# Patient Record
Sex: MALE | Race: WHITE | Employment: FULL TIME | URBAN - METROPOLITAN AREA
[De-identification: names, ages, dates, MRNs, and addresses within clinical notes are randomized per-mention and may not be internally consistent; named-entity substitution may affect disease eponyms.]

---

## 2017-02-08 ENCOUNTER — TELEPHONE (OUTPATIENT)
Dept: INTERNAL MEDICINE CLINIC | Age: 47
End: 2017-02-08

## 2017-02-08 DIAGNOSIS — E55.9 HYPOVITAMINOSIS D: ICD-10-CM

## 2017-02-08 DIAGNOSIS — Z00.00 ROUTINE GENERAL MEDICAL EXAMINATION AT A HEALTH CARE FACILITY: Primary | ICD-10-CM

## 2017-02-08 DIAGNOSIS — I10 ESSENTIAL HYPERTENSION: ICD-10-CM

## 2017-02-08 DIAGNOSIS — E78.5 DYSLIPIDEMIA: ICD-10-CM

## 2017-02-08 DIAGNOSIS — E66.9 OBESITY, UNSPECIFIED OBESITY SEVERITY, UNSPECIFIED OBESITY TYPE: ICD-10-CM

## 2017-02-08 DIAGNOSIS — R73.01 IFG (IMPAIRED FASTING GLUCOSE): ICD-10-CM

## 2017-02-08 NOTE — TELEPHONE ENCOUNTER
Please update labs. Pt was turned away at [facility] this morning saying no labs in system. He will go in tomorrow.

## 2017-02-10 ENCOUNTER — HOSPITAL ENCOUNTER (OUTPATIENT)
Dept: LAB | Age: 47
Discharge: HOME | End: 2017-02-10

## 2017-02-10 DIAGNOSIS — Z00.00 ROUTINE GENERAL MEDICAL EXAMINATION AT A HEALTH CARE FACILITY: ICD-10-CM

## 2017-02-10 DIAGNOSIS — R73.01 IFG (IMPAIRED FASTING GLUCOSE): ICD-10-CM

## 2017-02-10 DIAGNOSIS — E78.5 DYSLIPIDEMIA: ICD-10-CM

## 2017-02-10 DIAGNOSIS — E55.9 HYPOVITAMINOSIS D: ICD-10-CM

## 2017-02-10 DIAGNOSIS — E66.9 OBESITY, UNSPECIFIED OBESITY SEVERITY, UNSPECIFIED OBESITY TYPE: ICD-10-CM

## 2017-02-10 DIAGNOSIS — I10 ESSENTIAL HYPERTENSION: ICD-10-CM

## 2017-02-10 PROCEDURE — 99001 SPECIMEN HANDLING PT-LAB: CPT | Performed by: INTERNAL MEDICINE

## 2017-02-10 NOTE — PROGRESS NOTES
48 y.o. WHITE OR  male who presents for RPE    No cardiovascular complaints, bp in the 943O systolic when he checks      Denies polyuria, polydipsia, nocturia, vision change. Not checking sugars at this time. He had gotten down to 195lbs in the summer w better diet but gained it all back as the weather got colder. He is interested in hearing about haydee supp    Vitals 2/15/2017 12/2/2016 11/11/2016 11/4/2016 11/4/2016 3/1/2016   Weight 222 lb 205 lb 210 lb  210 lb 215 lb     Vitals 8/28/2015   Weight 217 lb 8 oz     No gi or gu complaints, no focal neuro complaints. He now reports having Fhx colon ca     He saw Dr Jesus Guadalupe for rheum opinion last spring, no new recs.   The psoriasis is not that active and no spondyloarthropathy sx to report, mostly his hands bother him and only intermittently, no morning stiffness    Past Medical History   Diagnosis Date    Calculus of kidney      Dr Gianna Mcmillan 2008; 11/16; ca oxalate in past    Cellulitis 1/14     Dr Gilford Jesus left leg    Dyslipidemia     Hypertension     Obesity      peak weight 220 lbs, bmi 30.7 from 2/15    Prediabetes 2011     2 hr gtt 160    Proptosis      right side, negative thyroid eval    Psoriasis      Past Surgical History   Procedure Laterality Date    Hx appendectomy      Hx heent       ear drum    Hx orthopaedic  12/13     left knee arthroscopy Dr Arash Amaya     Social History     Social History    Marital status:      Spouse name: N/A    Number of children: N/A    Years of education: N/A     Occupational History    rep for [employer]     Social History Main Topics    Smoking status: Former Smoker     Packs/day: 0.25     Years: 15.00     Quit date: 1/1/2003    Smokeless tobacco: Never Used    Alcohol use 1.5 oz/week     3 Glasses of wine per week      Comment: socially    Drug use: No    Sexual activity: Not on file     Other Topics Concern    Not on file     Social History Narrative     Current Outpatient Prescriptions   Medication Sig    lorcaserin (BELVIQ) 10 mg tab Take 1 Tab by mouth two (2) times a day. Max Daily Amount: 2 Tabs.  lisinopril (PRINIVIL, ZESTRIL) 10 mg tablet TAKE ONE TABLET BY MOUTH ONE TIME DAILY FOR BLOOD PRESSURE    atorvastatin (LIPITOR) 10 mg tablet TAKE ONE TABLET BY MOUTH ONE TIME DAILY    VITAMIN D2 50,000 unit capsule TAKE 1 TABLET BY MOUTH EVERY 7 DAYS    aspirin 81 mg tablet Take 81 mg by mouth. No current facility-administered medications for this visit. Allergies   Allergen Reactions    Omnicef [Cefdinir] Diarrhea     REVIEW OF SYSTEMS:   Ophtho - no vision change or eye pain  Oral - no mouth pain, tongue or tooth problems  Ears - no hearing loss, ear pain, fullness, no swallowing problems  Cardiac - no CP, PND, orthopnea, edema, palpitations or syncope  Chest - no breast masses  Resp - no wheezing, chronic coughing, dyspnea  GI - no heartburn, nausea, vomiting, change in bowel habits, bleeding, hemorrhoids  Urinary - no dysuria, hematuria, flank pain, urgency, frequency  Genitals - no genital lesions, discharge, masses, ulceration, warts  Derm - no nail abnormalities, rashes, lesions of note, hair loss  Psych - denies any anxiety or depression symptoms, no hallucinations or violent ideation  Constitutional - no wt loss, night sweats, unexplained fevers  Neuro - no focal weakness, numbness, paresthesias, incoordination, ataxia, involuntary movements  Endo - no polyuria, polydipsia, nocturia, hot flashes    Visit Vitals    /80    Pulse 80    Temp 98.1 °F (36.7 °C) (Oral)    Ht 5' 11\" (1.803 m)    Wt 222 lb (100.7 kg)    SpO2 97%    BMI 30.96 kg/m2   Affect is appropriate.   Mood stable  No apparent distress  HEENT --Anicteric sclerae, tympanic membranes normal,  ear canals normal.  PERRL, EOMI, conjunctiva and lids normal.  Disks were sharp  Sinuses were nontender, turbinates normal, hearing normal.  Oropharynx without  erythema, normal tongue, oral mucosa and tonsils. No thyromegaly, JVD, or bruits. Lungs --Clear to auscultation, normal percussion. Heart --Regular rate and rhythm, no murmurs, rubs, gallops, or clicks. Chest wall --Nontender to palpation. PMI normal.  Abdomen -- Soft and nontender, no hepatosplenomegaly or masses.  and rectal deferred w recent eval by urology  Extremities -- Without cyanosis, clubbing, edema. 2+ pulses equally and bilaterally.     LABS  From 9/06 showed   gluc 98,   cr 1.10,      alt 63,       chol 124, tg 323, hdl 28, ldl-c 31, wbc 5.7, hb 15.7, plt 258, tsh 1.86, ua neg, ast 40  From 11/09 showed gluc 96,   cr 1.20, gfr>60,  alt 66,       chol 125, tg 122, hdl 33, ldl-c 68  From 10/11 showed gluc 103, cr 1.14, gfr 79,   alt 42,          ldl-p 1106,                       tsh 1.80  From 3/12 showed   gluc 102, cr 0.87, gfr 106, alt 43, hba1c 5.7, ldl-p 528,   chol 102, tg 170, hdl 36, ldl-c 32  From 1/14 showed   gluc 99,   cr 0.93, gfr>60,                 wbc 9.5, hb 14.7, plt 326  From 2/15 showed   gluc 111, cr 0.84, gfr 105, alt 34, hba1c 5.7,      chol 108, tg 202, hdl 34, ldl-c 34, wbc 6.3, hb 15.3, plt 287,       ua neg  From 2/15 showed                   tsh 1.50, vit d 18.2, uric 5.0, hep b/c neg, ra neg, jeane neg, ccp neg, esr 2  From 10/15 showed         hba1c 5.6,      chol 92,   tg 141, hdl 31, ldl-c 33  From 2/16 showed   gluc 98,   cr 0.82, gfr>60,  alt 40,       chol 96,   tg 239, hdl 34, ldl-c 48, wbc 8.3, hb 15.5, plt 258, tsh 1.11, ua neg  From 3/16 showed                              ra neg, jeane neg, ccp neg, esr 2, crp 0.3  From 11/16 showed gluc 149, cr 1.30, gfr 59,   alt 51,                wbc 8.5, hb 15.1, plt 266, lip 175    Results for orders placed or performed during the hospital encounter of 63/52/15   METABOLIC PANEL, COMPREHENSIVE   Result Value Ref Range    Glucose 114 (H) 65 - 99 mg/dL    BUN 13 6 - 24 mg/dL    Creatinine 0.90 0.76 - 1.27 mg/dL    GFR est non- >59 mL/min/1.73    GFR est  >59 mL/min/1.73    BUN/Creatinine ratio 14 9 - 20    Sodium 140 134 - 144 mmol/L    Potassium 4.5 3.5 - 5.2 mmol/L    Chloride 99 96 - 106 mmol/L    CO2 22 18 - 29 mmol/L    Calcium 9.4 8.7 - 10.2 mg/dL    Protein, total 6.9 6.0 - 8.5 g/dL    Albumin 4.4 3.5 - 5.5 g/dL    GLOBULIN, TOTAL 2.5 1.5 - 4.5 g/dL    A-G Ratio 1.8 1.1 - 2.5    Bilirubin, total 0.5 0.0 - 1.2 mg/dL    Alk. phosphatase 60 39 - 117 IU/L    AST (SGOT) 27 0 - 40 IU/L    ALT (SGPT) 32 0 - 44 IU/L   LIPID PANEL   Result Value Ref Range    Cholesterol, total 98 (L) 100 - 199 mg/dL    Triglyceride 174 (H) 0 - 149 mg/dL    HDL Cholesterol 29 (L) >39 mg/dL    VLDL, calculated 35 5 - 40 mg/dL    LDL, calculated 34 0 - 99 mg/dL   HEMOGLOBIN A1C   Result Value Ref Range    Hemoglobin A1c 5.7 (H) 4.8 - 5.6 %    Estimated average glucose 117 mg/dL   TSH, 3RD GENERATION   Result Value Ref Range    TSH 1.530 0.450 - 4.500 uIU/mL   VITAMIN D, 25 HYDROXY   Result Value Ref Range    VITAMIN D, 25-HYDROXY 34.2 30.0 - 100.0 ng/mL   CK   Result Value Ref Range    Creatine Kinase,Total 296 (H) 24 - 204 U/L   CVD REPORT   Result Value Ref Range    INTERPRETATION Note      Patient Active Problem List   Diagnosis Code    Dyslipidemia E78.5    Obesity E66.9    IFG (impaired fasting glucose) R73.01    Hypovitaminosis D E55.9    Essential hypertension I10    Obesity (BMI 30.0-34. 9) E66.9    Primary hypertension I10    Nephrolithiasis Dr Hilario Gaxiola N20.0     Assessment and plan:  1. Dyslipidemia. Continue lipitor  2. Prediabetes. Lifestyle and dietary measures, wt loss would be ideal.   3.  HTN. Continue current  4. Obesity. Lifestyle and dietary measures, portion control. Long discussion about available haydee supp, he opted on trial of belviq.  sfx discussed at length, script and coupon given, f/u 3-4 mos  5. Rheum. Observation  6. Hypovit D. Doing well   7. Nephrolithiasis.   Hydration and f/u urology      RTC 8/17    Above conditions discussed at length and patient vocalized understanding.   All questions answered to patient satifaction

## 2017-02-11 LAB
25(OH)D3+25(OH)D2 SERPL-MCNC: 34.2 NG/ML (ref 30–100)
ALBUMIN SERPL-MCNC: 4.4 G/DL (ref 3.5–5.5)
ALBUMIN/GLOB SERPL: 1.8 {RATIO} (ref 1.1–2.5)
ALP SERPL-CCNC: 60 IU/L (ref 39–117)
ALT SERPL-CCNC: 32 IU/L (ref 0–44)
AST SERPL-CCNC: 27 IU/L (ref 0–40)
BILIRUB SERPL-MCNC: 0.5 MG/DL (ref 0–1.2)
BUN SERPL-MCNC: 13 MG/DL (ref 6–24)
BUN/CREAT SERPL: 14 (ref 9–20)
CALCIUM SERPL-MCNC: 9.4 MG/DL (ref 8.7–10.2)
CHLORIDE SERPL-SCNC: 99 MMOL/L (ref 96–106)
CHOLEST SERPL-MCNC: 98 MG/DL (ref 100–199)
CK SERPL-CCNC: 296 U/L (ref 24–204)
CO2 SERPL-SCNC: 22 MMOL/L (ref 18–29)
CREAT SERPL-MCNC: 0.9 MG/DL (ref 0.76–1.27)
EST. AVERAGE GLUCOSE BLD GHB EST-MCNC: 117 MG/DL
GLOBULIN SER CALC-MCNC: 2.5 G/DL (ref 1.5–4.5)
GLUCOSE SERPL-MCNC: 114 MG/DL (ref 65–99)
HBA1C MFR BLD: 5.7 % (ref 4.8–5.6)
HDLC SERPL-MCNC: 29 MG/DL
INTERPRETATION, 910389: NORMAL
LDLC SERPL CALC-MCNC: 34 MG/DL (ref 0–99)
POTASSIUM SERPL-SCNC: 4.5 MMOL/L (ref 3.5–5.2)
PROT SERPL-MCNC: 6.9 G/DL (ref 6–8.5)
SODIUM SERPL-SCNC: 140 MMOL/L (ref 134–144)
TRIGL SERPL-MCNC: 174 MG/DL (ref 0–149)
TSH SERPL DL<=0.005 MIU/L-ACNC: 1.53 UIU/ML (ref 0.45–4.5)
VLDLC SERPL CALC-MCNC: 35 MG/DL (ref 5–40)

## 2017-02-15 ENCOUNTER — OFFICE VISIT (OUTPATIENT)
Dept: INTERNAL MEDICINE CLINIC | Age: 47
End: 2017-02-15

## 2017-02-15 VITALS
BODY MASS INDEX: 31.08 KG/M2 | TEMPERATURE: 98.1 F | WEIGHT: 222 LBS | OXYGEN SATURATION: 97 % | DIASTOLIC BLOOD PRESSURE: 80 MMHG | HEIGHT: 71 IN | HEART RATE: 80 BPM | SYSTOLIC BLOOD PRESSURE: 114 MMHG

## 2017-02-15 DIAGNOSIS — E78.5 DYSLIPIDEMIA: ICD-10-CM

## 2017-02-15 DIAGNOSIS — I10 PRIMARY HYPERTENSION: ICD-10-CM

## 2017-02-15 DIAGNOSIS — E55.9 HYPOVITAMINOSIS D: ICD-10-CM

## 2017-02-15 DIAGNOSIS — R73.01 IFG (IMPAIRED FASTING GLUCOSE): ICD-10-CM

## 2017-02-15 DIAGNOSIS — E66.9 OBESITY (BMI 30.0-34.9): ICD-10-CM

## 2017-02-15 DIAGNOSIS — Z00.00 PHYSICAL EXAM: Primary | ICD-10-CM

## 2017-02-15 PROBLEM — N20.0 NEPHROLITHIASIS: Status: ACTIVE | Noted: 2017-02-15

## 2017-02-15 NOTE — MR AVS SNAPSHOT
Visit Information Date & Time Provider Department Dept. Phone Encounter #  
 2/15/2017 10:30 AM Adele Soto MD [facility] [address] [id number] Upcoming Health Maintenance Date Due DTaP/Tdap/Td series (1 - Tdap) 12/23/1989 Allergies as of 2/15/2017  Review Complete On: 12/2/2016 By: Wale Celaya Severity Noted Reaction Type Reactions Omnicef [Cefdinir]    Diarrhea Current Immunizations  Reviewed on 1/8/2015 Name Date Influenza Vaccine 11/1/2016, 9/20/2016 Influenza Vaccine Split 10/24/2011 Influenza Vaccine Whole 12/4/2006 Not reviewed this visit You Were Diagnosed With   
  
 Codes Comments Obesity (BMI 30.0-34.9)    -  Primary ICD-10-CM: W02.5 ICD-9-CM: 278.00 Dyslipidemia     ICD-10-CM: E78.5 ICD-9-CM: 272.4 IFG (impaired fasting glucose)     ICD-10-CM: R73.01 
ICD-9-CM: 790.21 Primary hypertension     ICD-10-CM: I10 
ICD-9-CM: 401.9 Hypovitaminosis D     ICD-10-CM: E55.9 ICD-9-CM: 268.9 Vitals BP Pulse Temp Height(growth percentile) Weight(growth percentile) SpO2  
 114/80 80 98.1 °F (36.7 °C) (Oral) 5' 11\" (1.803 m) 222 lb (100.7 kg) 97% BMI Smoking Status 30.96 kg/m2 Former Smoker Vitals History BMI and BSA Data Body Mass Index Body Surface Area 30.96 kg/m 2 2.25 m 2 Preferred Pharmacy Pharmacy Name Phone [facility], [address] [place] [address] [phone] Your Updated Medication List  
  
   
This list is accurate as of: 2/15/17 11:17 AM.  Always use your most recent med list.  
  
  
  
  
 aspirin 81 mg tablet Take 81 mg by mouth. atorvastatin 10 mg tablet Commonly known as:  LIPITOR  
TAKE ONE TABLET BY MOUTH ONE TIME DAILY  
  
 lisinopril 10 mg tablet Commonly known as:  PRINIVIL, ZESTRIL  
TAKE ONE TABLET BY MOUTH ONE TIME DAILY FOR BLOOD PRESSURE  
  
 lorcaserin 10 mg Tab Commonly known as:  Denece Sprinkles Take 1 Tab by mouth two (2) times a day. Max Daily Amount: 2 Tabs. VITAMIN D2 50,000 unit capsule Generic drug:  ergocalciferol TAKE 1 TABLET BY MOUTH EVERY 7 DAYS Prescriptions Printed Refills  
 lorcaserin (BELVIQ) 10 mg tab 5 Sig: Take 1 Tab by mouth two (2) times a day. Max Daily Amount: 2 Tabs. Class: Print Route: Oral  
  
Introducing [facility]! Dear Radha Russo: Thank you for requesting a [employer] account. Our records indicate that you already have an active [employer] account. You can access your account anytime at [email or web address]. [email or web address] Did you know that you can access your hospital and ER discharge instructions at any time in [employer]? You can also review all of your test results from your hospital stay or ER visit. Additional Information If you have questions, please visit the Frequently Asked Questions section of the [employer] website at [email or web address] Remember, [employer] is NOT to be used for urgent needs. For medical emergencies, dial 911. Now available from your iPhone and Android! Please provide this summary of care documentation to your next provider. Your primary care clinician is listed as Bijal Temple. If you have any questions after today's visit, please call [phone].

## 2017-02-15 NOTE — PROGRESS NOTES
1. Have you been to the ER, urgent care clinic or hospitalized since your last visit? NO.     2. Have you seen or consulted any other health care providers outside of the [facility] since your last visit (Include any pap smears or colon screening)? NO      Do you have an Advanced Directive? NO    Would you like information on Advanced Directives?  YES

## 2017-10-05 RX ORDER — ATORVASTATIN CALCIUM 10 MG/1
TABLET, FILM COATED ORAL
Qty: 90 TAB | Refills: 1 | Status: SHIPPED | OUTPATIENT
Start: 2017-10-05 | End: 2018-04-06 | Stop reason: SDUPTHER

## 2017-10-20 DIAGNOSIS — M25.50 ARTHRALGIA: ICD-10-CM

## 2017-10-20 DIAGNOSIS — E55.9 HYPOVITAMINOSIS D: ICD-10-CM

## 2017-10-21 RX ORDER — ERGOCALCIFEROL 1.25 MG/1
CAPSULE ORAL
Qty: 13 CAP | Refills: 2 | Status: SHIPPED | OUTPATIENT
Start: 2017-10-21 | End: 2019-01-22 | Stop reason: SDUPTHER

## 2018-01-07 RX ORDER — LISINOPRIL 10 MG/1
TABLET ORAL
Qty: 90 TAB | Refills: 3 | Status: SHIPPED | OUTPATIENT
Start: 2018-01-07 | End: 2019-01-22 | Stop reason: SDUPTHER

## 2018-04-06 RX ORDER — ATORVASTATIN CALCIUM 10 MG/1
TABLET, FILM COATED ORAL
Qty: 90 TAB | Refills: 1 | Status: SHIPPED | OUTPATIENT
Start: 2018-04-06 | End: 2018-10-07 | Stop reason: SDUPTHER

## 2018-10-07 RX ORDER — ATORVASTATIN CALCIUM 10 MG/1
TABLET, FILM COATED ORAL
Qty: 90 TAB | Refills: 1 | Status: SHIPPED | OUTPATIENT
Start: 2018-10-07 | End: 2019-01-22 | Stop reason: SDUPTHER

## 2019-01-14 RX ORDER — LISINOPRIL 10 MG/1
TABLET ORAL
Qty: 90 TAB | Refills: 3 | OUTPATIENT
Start: 2019-01-14

## 2019-01-15 ENCOUNTER — TELEPHONE (OUTPATIENT)
Dept: INTERNAL MEDICINE CLINIC | Age: 49
End: 2019-01-15

## 2019-01-20 NOTE — PROGRESS NOTES
46 y.o. WHITE OR  male who presents for eval 
 
We have not seen him in about 2 years but he seems to doing okay. He apparently changed jobs and has been doing a lot of traveling all over the country. No cardiovascular complaints, bp controlled when he checks. Not much time for exercise because of the traveling in his schedule, he is in the OR very early in the morning to help out with procedures. Denies polyuria, polydipsia, nocturia, vision change. Not checking sugars at this time. Not much success with attempts at weight loss, the Belviq did not do much. Vitals 1/22/2019 2/15/2017 12/2/2016 11/11/2016 11/4/2016 Weight 224 lb 222 lb 205 lb 210 lb Vitals 11/4/2016 Weight 210 lb No gi or gu complaints although he's sure he passed a stone in July 2018. He is due for colon screening IF 1/19 Past Medical History:  
Diagnosis Date  Calculus of kidney Dr Rey Schilling 2008; 11/16; ca oxalate in past  
 Cellulitis 1/14 Dr Zunilda Crespo left leg  Dyslipidemia  Hypertension  Obesity IF 1/19 start weight 224 lbs  Prediabetes 2011  
 2 hr gtt 160  Proptosis   
 right side, negative thyroid eval  
 Psoriasis Past Surgical History:  
Procedure Laterality Date  HX APPENDECTOMY  HX HEENT    
 ear drum  HX ORTHOPAEDIC  12/13  
 left knee arthroscopy Dr Laura Dumont Social History Socioeconomic History  Marital status:  Spouse name: Not on file  Number of children: Not on file  Years of education: Not on file  Highest education level: Not on file Social Needs  Financial resource strain: Not on file  Food insecurity - worry: Not on file  Food insecurity - inability: Not on file  Transportation needs - medical: Not on file  Transportation needs - non-medical: Not on file Occupational History  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[employer]  
Tobacco Use  Smoking status: Former Smoker Packs/day: 0.25   Years: 15.00  
 Pack years: 3.75 Last attempt to quit: 2003 Years since quittin.0  Smokeless tobacco: Never Used Substance and Sexual Activity  Alcohol use: Yes Alcohol/week: 1.5 oz Types: 3 Glasses of wine per week Comment: socially  Drug use: No  
 Sexual activity: Not on file Other Topics Concern  Not on file Social History Narrative  Not on file Current Outpatient Medications Medication Sig  
 atorvastatin (LIPITOR) 10 mg tablet TAKE ONE TABLET BY MOUTH ONE TIME DAILY  lisinopril (PRINIVIL, ZESTRIL) 10 mg tablet TAKE ONE TABLET BY MOUTH ONE TIME DAILY FOR BLOOD PRESSURE  ergocalciferol (VITAMIN D2) 50,000 unit capsule TAKE 1 CAPSULE BY MOUTH EVERY 7 DAYS  aspirin 81 mg tablet Take 81 mg by mouth. No current facility-administered medications for this visit. Allergies Allergen Reactions  Omnicef [Cefdinir] Diarrhea REVIEW OF SYSTEMS:  
Ophtho  no vision change or eye pain Oral  no mouth pain, tongue or tooth problems Ears  no hearing loss, ear pain, fullness, no swallowing problems Cardiac  no CP, PND, orthopnea, edema, palpitations or syncope Chest  no breast masses Resp  no wheezing, chronic coughing, dyspnea GI  no heartburn, nausea, vomiting, change in bowel habits, bleeding, hemorrhoids Urinary  no dysuria, hematuria, flank pain, urgency, frequency Genitals  no genital lesions, discharge, masses, ulceration, warts Derm  no nail abnormalities, rashes, lesions of note, hair loss Psych  denies any anxiety or depression symptoms, no hallucinations or violent ideation Visit Vitals /84 Pulse 83 Temp 98.3 °F (36.8 °C) (Oral) Resp 14 Ht 5' 11\" (1.803 m) Wt 224 lb (101.6 kg) SpO2 95% BMI 31.24 kg/m² Affect is appropriate. Mood stable No apparent distress HEENT --Anicteric sclerae, tympanic membranes normal,  ear canals normal. 
PERRL, EOMI, conjunctiva and lids normal.  Disks were sharp Sinuses were nontender, turbinates normal, hearing normal.  Oropharynx without 
erythema, normal tongue, oral mucosa and tonsils. No thyromegaly, JVD, or bruits. Lungs --Clear to auscultation, normal percussion. Heart --Regular rate and rhythm, no murmurs, rubs, gallops, or clicks. Chest wall --Nontender to palpation. PMI normal. 
Abdomen -- Soft and nontender, no hepatosplenomegaly or masses.y Extremities -- Without cyanosis, clubbing, edema. 2+ pulses equally and bilaterally. LABS From 9/06 showed   gluc 98,   cr 1.10,      alt 63,       chol 124, tg 323, hdl 28, ldl-c 31, wbc 5.7, hb 15.7, plt 258, tsh 1.86, ua neg, ast 40 From 11/09 showed gluc 96,   cr 1.20, gfr>60,  alt 66,       chol 125, tg 122, hdl 33, ldl-c 68 From 10/11 showed gluc 103, cr 1.14, gfr 79,   alt 42,          ldl-p 1106,                       tsh 1.80 From 3/12 showed   gluc 102, cr 0.87, gfr 106, alt 43, hba1c 5.7, ldl-p 528,   chol 102, tg 170, hdl 36, ldl-c 32 From 1/14 showed   gluc 99,   cr 0.93, gfr>60,                 wbc 9.5, hb 14.7, plt 326 From 2/15 showed   gluc 111, cr 0.84, gfr 105, alt 34, hba1c 5.7,      chol 108, tg 202, hdl 34, ldl-c 34, wbc 6.3, hb 15.3, plt 287,       ua neg From 2/15 showed                   tsh 1.50, vit d 18.2, uric 5.0, hep b/c neg, ra neg, jeane neg, ccp neg, esr 2 From 10/15 showed         hba1c 5.6,      chol 92,   tg 141, hdl 31, ldl-c 33 From 2/16 showed   gluc 98,   cr 0.82, gfr>60,  alt 40,       chol 96,   tg 239, hdl 34, ldl-c 48, wbc 8.3, hb 15.5, plt 258, tsh 1.11, ua neg From 3/16 showed                              ra neg, jeane neg, ccp neg, esr 2, crp 0.3 From 11/16 showed gluc 149, cr 1.30, gfr 59,   alt 51,                wbc 8.5, hb 15.1, plt 266, lip 175 From 2/17 showed   gluc 114, cr 0.90, gfr 104,     hba1c 5.7,      chol 98,   tg 174, hdl 29, ldl-c 34,                 tsh 1.53, vit d 34.2 Patient Active Problem List  
Diagnosis Code  Dyslipidemia E78.5  Obesity E66.9  
 IFG (impaired fasting glucose) R73.01  
 Hypovitaminosis D E55.9  Essential hypertension I10  
 Obesity (BMI 30.0-34. 9) E66.9  
 Primary hypertension I10  
 Nephrolithiasis Dr Bethena Gosselin N20.0 Assessment and plan: 1. Dyslipidemia. Continue lipitor 2. Prediabetes. Lifestyle and dietary measures, wt loss would be ideal.  
3.  HTN. Continue current 4. Obesity. Lifestyle and dietary measures, portion control. Intermittent fasting discussed at length. Explained the concepts in detail. Went over possible physiologic changes that could occur and how that would possibly impact his situation in a positive way. Discussed 16:8 program in particular. We also went over the differences between hunger and oralia hypoglycemia. Look up low insulin index foods. He will do some more research and consider implementing in the near future, standard handout given 5. Rheum. Observation 6. Hypovit D. Check levels 7. Nephrolithiasis. Hydration and f/u urology RTC 1/20 Above conditions discussed at length and patient vocalized understanding. All questions answered to patient satisfaction TOTAL time 40 min spent of which at least 30 min was on counseling, answering questions and coordination of care

## 2019-01-22 ENCOUNTER — OFFICE VISIT (OUTPATIENT)
Dept: INTERNAL MEDICINE CLINIC | Age: 49
End: 2019-01-22

## 2019-01-22 VITALS
OXYGEN SATURATION: 95 % | HEART RATE: 83 BPM | DIASTOLIC BLOOD PRESSURE: 84 MMHG | HEIGHT: 71 IN | SYSTOLIC BLOOD PRESSURE: 128 MMHG | WEIGHT: 224 LBS | TEMPERATURE: 98.3 F | RESPIRATION RATE: 14 BRPM | BODY MASS INDEX: 31.36 KG/M2

## 2019-01-22 DIAGNOSIS — E55.9 HYPOVITAMINOSIS D: ICD-10-CM

## 2019-01-22 DIAGNOSIS — M25.50 ARTHRALGIA: ICD-10-CM

## 2019-01-22 DIAGNOSIS — Z12.11 ENCOUNTER FOR SCREENING COLONOSCOPY: ICD-10-CM

## 2019-01-22 DIAGNOSIS — E66.9 OBESITY (BMI 30-39.9): ICD-10-CM

## 2019-01-22 DIAGNOSIS — I10 ESSENTIAL HYPERTENSION: Primary | ICD-10-CM

## 2019-01-22 DIAGNOSIS — R73.01 IFG (IMPAIRED FASTING GLUCOSE): ICD-10-CM

## 2019-01-22 DIAGNOSIS — E78.5 DYSLIPIDEMIA: ICD-10-CM

## 2019-01-22 RX ORDER — ATORVASTATIN CALCIUM 10 MG/1
TABLET, FILM COATED ORAL
Qty: 90 TAB | Refills: 1 | Status: SHIPPED | OUTPATIENT
Start: 2019-01-22 | End: 2020-04-02

## 2019-01-22 RX ORDER — ERGOCALCIFEROL 1.25 MG/1
CAPSULE ORAL
Qty: 13 CAP | Refills: 2 | Status: SHIPPED | OUTPATIENT
Start: 2019-01-22 | End: 2020-02-11

## 2019-01-22 RX ORDER — LISINOPRIL 10 MG/1
TABLET ORAL
Qty: 90 TAB | Refills: 3 | Status: SHIPPED | OUTPATIENT
Start: 2019-01-22 | End: 2020-08-18

## 2019-01-22 NOTE — PROGRESS NOTES
1. Have you been to the ER, urgent care clinic or hospitalized since your last visit? NO.  
 
2. Have you seen or consulted any other health care providers outside of the [address] [place] [address] since your last visit (Include any pap smears or colon screening)? NO Do you have an Advanced Directive? NO Would you like information on Advanced Directives?  NO

## 2019-01-26 ENCOUNTER — HOSPITAL ENCOUNTER (OUTPATIENT)
Dept: LAB | Age: 49
Discharge: HOME | End: 2019-01-26
Payer: COMMERCIAL

## 2019-01-26 DIAGNOSIS — E55.9 HYPOVITAMINOSIS D: ICD-10-CM

## 2019-01-26 DIAGNOSIS — E78.5 DYSLIPIDEMIA: ICD-10-CM

## 2019-01-26 DIAGNOSIS — R73.01 IFG (IMPAIRED FASTING GLUCOSE): ICD-10-CM

## 2019-01-26 LAB
25(OH)D3 SERPL-MCNC: 28.4 NG/ML (ref 30–100)
ALBUMIN SERPL-MCNC: 4.2 G/DL (ref 3.4–5)
ALBUMIN/GLOB SERPL: 1.3 {RATIO} (ref 0.8–1.7)
ALP SERPL-CCNC: 65 U/L (ref 45–117)
ALT SERPL-CCNC: 43 U/L (ref 16–61)
ANION GAP SERPL CALC-SCNC: 5 MMOL/L (ref 3–18)
AST SERPL-CCNC: 30 U/L (ref 15–37)
BILIRUB SERPL-MCNC: 0.7 MG/DL (ref 0.2–1)
BUN SERPL-MCNC: 12 MG/DL (ref 7–18)
BUN/CREAT SERPL: 13 (ref 12–20)
CALCIUM SERPL-MCNC: 8.7 MG/DL (ref 8.5–10.1)
CHLORIDE SERPL-SCNC: 107 MMOL/L (ref 100–108)
CHOLEST SERPL-MCNC: 90 MG/DL
CO2 SERPL-SCNC: 28 MMOL/L (ref 21–32)
CREAT SERPL-MCNC: 0.94 MG/DL (ref 0.6–1.3)
ERYTHROCYTE [DISTWIDTH] IN BLOOD BY AUTOMATED COUNT: 12.4 % (ref 11.6–14.5)
GLOBULIN SER CALC-MCNC: 3.2 G/DL (ref 2–4)
GLUCOSE SERPL-MCNC: 109 MG/DL (ref 74–99)
HBA1C MFR BLD: 5.5 % (ref 4.2–5.6)
HCT VFR BLD AUTO: 47.2 % (ref 36–48)
HDLC SERPL-MCNC: 33 MG/DL (ref 40–60)
HDLC SERPL: 2.7 {RATIO} (ref 0–5)
HGB BLD-MCNC: 16.1 G/DL (ref 13–16)
LDLC SERPL CALC-MCNC: 28.8 MG/DL (ref 0–100)
LIPID PROFILE,FLP: ABNORMAL
MCH RBC QN AUTO: 30.4 PG (ref 24–34)
MCHC RBC AUTO-ENTMCNC: 34.1 G/DL (ref 31–37)
MCV RBC AUTO: 89.2 FL (ref 74–97)
PLATELET # BLD AUTO: 230 K/UL (ref 135–420)
PMV BLD AUTO: 11.6 FL (ref 9.2–11.8)
POTASSIUM SERPL-SCNC: 4.3 MMOL/L (ref 3.5–5.5)
PROT SERPL-MCNC: 7.4 G/DL (ref 6.4–8.2)
PSA SERPL-MCNC: 0.5 NG/ML (ref 0–4)
RBC # BLD AUTO: 5.29 M/UL (ref 4.7–5.5)
SODIUM SERPL-SCNC: 140 MMOL/L (ref 136–145)
TRIGL SERPL-MCNC: 141 MG/DL (ref ?–150)
VLDLC SERPL CALC-MCNC: 28.2 MG/DL
WBC # BLD AUTO: 6.9 K/UL (ref 4.6–13.2)

## 2019-01-26 PROCEDURE — 84153 ASSAY OF PSA TOTAL: CPT

## 2019-01-26 PROCEDURE — 83036 HEMOGLOBIN GLYCOSYLATED A1C: CPT

## 2019-01-26 PROCEDURE — 80053 COMPREHEN METABOLIC PANEL: CPT

## 2019-01-26 PROCEDURE — 80061 LIPID PANEL: CPT

## 2019-01-26 PROCEDURE — 36415 COLL VENOUS BLD VENIPUNCTURE: CPT

## 2019-01-26 PROCEDURE — 85027 COMPLETE CBC AUTOMATED: CPT

## 2019-01-26 PROCEDURE — 82306 VITAMIN D 25 HYDROXY: CPT

## 2019-02-06 ENCOUNTER — TELEPHONE (OUTPATIENT)
Dept: INTERNAL MEDICINE CLINIC | Age: 49
End: 2019-02-06

## 2019-02-07 NOTE — TELEPHONE ENCOUNTER
pls call    Results for orders placed or performed during the hospital encounter of 01/26/19   CBC W/O DIFF   Result Value Ref Range    WBC 6.9 4.6 - 13.2 K/uL    RBC 5.29 4.70 - 5.50 M/uL    HGB 16.1 (H) 13.0 - 16.0 g/dL    HCT 47.2 36.0 - 48.0 %    MCV 89.2 74.0 - 97.0 FL    MCH 30.4 24.0 - 34.0 PG    MCHC 34.1 31.0 - 37.0 g/dL    RDW 12.4 11.6 - 14.5 %    PLATELET 082 856 - 965 K/uL    MPV 11.6 9.2 - 11.8 FL   LIPID PANEL   Result Value Ref Range    LIPID PROFILE          Cholesterol, total 90 <200 MG/DL    Triglyceride 141 <150 MG/DL    HDL Cholesterol 33 (L) 40 - 60 MG/DL    LDL, calculated 28.8 0 - 100 MG/DL    VLDL, calculated 28.2 MG/DL    CHOL/HDL Ratio 2.7 0 - 5.0     METABOLIC PANEL, COMPREHENSIVE   Result Value Ref Range    Sodium 140 136 - 145 mmol/L    Potassium 4.3 3.5 - 5.5 mmol/L    Chloride 107 100 - 108 mmol/L    CO2 28 21 - 32 mmol/L    Anion gap 5 3.0 - 18 mmol/L    Glucose 109 (H) 74 - 99 mg/dL    BUN 12 7.0 - 18 MG/DL    Creatinine 0.94 0.6 - 1.3 MG/DL    BUN/Creatinine ratio 13 12 - 20      GFR est AA >60 >60 ml/min/1.73m2    GFR est non-AA >60 >60 ml/min/1.73m2    Calcium 8.7 8.5 - 10.1 MG/DL    Bilirubin, total 0.7 0.2 - 1.0 MG/DL    ALT (SGPT) 43 16 - 61 U/L    AST (SGOT) 30 15 - 37 U/L    Alk.  phosphatase 65 45 - 117 U/L    Protein, total 7.4 6.4 - 8.2 g/dL    Albumin 4.2 3.4 - 5.0 g/dL    Globulin 3.2 2.0 - 4.0 g/dL    A-G Ratio 1.3 0.8 - 1.7     PSA, DIAGNOSTIC (PROSTATE SPECIFIC AG)   Result Value Ref Range    Prostate Specific Ag 0.5 0.0 - 4.0 ng/mL   HEMOGLOBIN A1C W/O EAG   Result Value Ref Range    Hemoglobin A1c 5.5 4.2 - 5.6 %   VITAMIN D, 25 HYDROXY   Result Value Ref Range    Vitamin D 25-Hydroxy 28.4 (L) 30 - 100 ng/mL     Cbc ok  Lipid ok except low hdl - exercise  Gluc elev but a1c ok - exercise, diet, wt loss, recheck 1 year  psa ok  Vit d borderline low - 1000 internationa units  Daily otc

## 2019-02-08 NOTE — TELEPHONE ENCOUNTER
Pt aware of message below and verbalized understanding. Pt stated he is already taking vit d2 50,000 unit once a week, did you still want him to add vit d 1000 units daily ?  Please advise

## 2019-02-11 NOTE — TELEPHONE ENCOUNTER
Chief Complaint   Patient presents with    Medication Evaluation     per Dr René Celis the patient does not need to take over the counter Vitamin D 1000 units, he is to continue to take prescribed Vitamin D 58139 units per week     02-11-19 left voice message to return my call.

## 2019-02-11 NOTE — TELEPHONE ENCOUNTER
Spoke with patients wife (on Permission to release form) advised her to tell him to stay on current dosing of vit d

## 2020-02-09 DIAGNOSIS — M25.50 ARTHRALGIA: ICD-10-CM

## 2020-02-09 DIAGNOSIS — E55.9 HYPOVITAMINOSIS D: ICD-10-CM

## 2020-02-11 RX ORDER — ERGOCALCIFEROL 1.25 MG/1
CAPSULE ORAL
Qty: 12 CAP | Refills: 3 | Status: SHIPPED | OUTPATIENT
Start: 2020-02-11 | End: 2021-02-10

## 2020-04-02 DIAGNOSIS — E78.5 DYSLIPIDEMIA: ICD-10-CM

## 2020-04-02 RX ORDER — ATORVASTATIN CALCIUM 10 MG/1
TABLET, FILM COATED ORAL
Qty: 90 TAB | Refills: 1 | Status: SHIPPED | OUTPATIENT
Start: 2020-04-02 | End: 2020-11-10

## 2020-04-28 ENCOUNTER — HOSPITAL ENCOUNTER (EMERGENCY)
Age: 50
Discharge: HOME | End: 2020-04-28
Attending: EMERGENCY MEDICINE
Payer: COMMERCIAL

## 2020-04-28 ENCOUNTER — APPOINTMENT (OUTPATIENT)
Dept: GENERAL RADIOLOGY | Age: 50
End: 2020-04-28
Attending: EMERGENCY MEDICINE
Payer: COMMERCIAL

## 2020-04-28 ENCOUNTER — APPOINTMENT (OUTPATIENT)
Dept: CT IMAGING | Age: 50
End: 2020-04-28
Attending: EMERGENCY MEDICINE
Payer: COMMERCIAL

## 2020-04-28 VITALS
WEIGHT: 220 LBS | RESPIRATION RATE: 17 BRPM | TEMPERATURE: 98.2 F | DIASTOLIC BLOOD PRESSURE: 76 MMHG | SYSTOLIC BLOOD PRESSURE: 164 MMHG | OXYGEN SATURATION: 100 % | HEIGHT: 68 IN | HEART RATE: 74 BPM | BODY MASS INDEX: 33.34 KG/M2

## 2020-04-28 DIAGNOSIS — N40.0 ENLARGED PROSTATE: ICD-10-CM

## 2020-04-28 DIAGNOSIS — R10.9 ACUTE RIGHT FLANK PAIN: ICD-10-CM

## 2020-04-28 DIAGNOSIS — N20.1 RIGHT URETERAL STONE: Primary | ICD-10-CM

## 2020-04-28 DIAGNOSIS — N13.5 UPJ (URETEROPELVIC JUNCTION) OBSTRUCTION: ICD-10-CM

## 2020-04-28 DIAGNOSIS — K42.9 UMBILICAL HERNIA WITHOUT OBSTRUCTION AND WITHOUT GANGRENE: ICD-10-CM

## 2020-04-28 DIAGNOSIS — K40.90 INGUINAL HERNIA WITHOUT OBSTRUCTION OR GANGRENE, RECURRENCE NOT SPECIFIED, UNSPECIFIED LATERALITY: ICD-10-CM

## 2020-04-28 DIAGNOSIS — N13.30 HYDRONEPHROSIS OF RIGHT KIDNEY: ICD-10-CM

## 2020-04-28 LAB
ANION GAP SERPL CALC-SCNC: 8 MMOL/L (ref 3–18)
APPEARANCE UR: CLEAR
BACTERIA URNS QL MICRO: NEGATIVE /HPF
BASOPHILS # BLD: 0 K/UL (ref 0–0.1)
BASOPHILS NFR BLD: 0 % (ref 0–2)
BILIRUB UR QL: NEGATIVE
BUN SERPL-MCNC: 15 MG/DL (ref 7–18)
BUN/CREAT SERPL: 13 (ref 12–20)
CALCIUM SERPL-MCNC: 9.1 MG/DL (ref 8.5–10.1)
CHLORIDE SERPL-SCNC: 104 MMOL/L (ref 100–111)
CO2 SERPL-SCNC: 24 MMOL/L (ref 21–32)
COLOR UR: YELLOW
CREAT SERPL-MCNC: 1.15 MG/DL (ref 0.6–1.3)
DIFFERENTIAL METHOD BLD: ABNORMAL
EOSINOPHIL # BLD: 0 K/UL (ref 0–0.4)
EOSINOPHIL NFR BLD: 0 % (ref 0–5)
EPITH CASTS URNS QL MICRO: ABNORMAL /LPF (ref 0–5)
ERYTHROCYTE [DISTWIDTH] IN BLOOD BY AUTOMATED COUNT: 12.3 % (ref 11.6–14.5)
GLUCOSE SERPL-MCNC: 182 MG/DL (ref 74–99)
GLUCOSE UR STRIP.AUTO-MCNC: NEGATIVE MG/DL
HCT VFR BLD AUTO: 46.7 % (ref 36–48)
HGB BLD-MCNC: 16.2 G/DL (ref 13–16)
HGB UR QL STRIP: NEGATIVE
KETONES UR QL STRIP.AUTO: ABNORMAL MG/DL
LEUKOCYTE ESTERASE UR QL STRIP.AUTO: NEGATIVE
LYMPHOCYTES # BLD: 1 K/UL (ref 0.9–3.6)
LYMPHOCYTES NFR BLD: 8 % (ref 21–52)
MCH RBC QN AUTO: 30 PG (ref 24–34)
MCHC RBC AUTO-ENTMCNC: 34.7 G/DL (ref 31–37)
MCV RBC AUTO: 86.5 FL (ref 74–97)
MONOCYTES # BLD: 0.7 K/UL (ref 0.05–1.2)
MONOCYTES NFR BLD: 5 % (ref 3–10)
MUCOUS THREADS URNS QL MICRO: ABNORMAL /LPF
NEUTS SEG # BLD: 11.9 K/UL (ref 1.8–8)
NEUTS SEG NFR BLD: 87 % (ref 40–73)
NITRITE UR QL STRIP.AUTO: NEGATIVE
PH UR STRIP: 5 [PH] (ref 5–8)
PLATELET # BLD AUTO: 245 K/UL (ref 135–420)
PMV BLD AUTO: 11.5 FL (ref 9.2–11.8)
POTASSIUM SERPL-SCNC: 4.2 MMOL/L (ref 3.5–5.5)
PROT UR STRIP-MCNC: ABNORMAL MG/DL
RBC # BLD AUTO: 5.4 M/UL (ref 4.7–5.5)
RBC #/AREA URNS HPF: NEGATIVE /HPF (ref 0–5)
SODIUM SERPL-SCNC: 136 MMOL/L (ref 136–145)
SP GR UR REFRACTOMETRY: 1.02 (ref 1–1.03)
UROBILINOGEN UR QL STRIP.AUTO: 1 EU/DL (ref 0.2–1)
WBC # BLD AUTO: 13.7 K/UL (ref 4.6–13.2)
WBC URNS QL MICRO: ABNORMAL /HPF (ref 0–4)

## 2020-04-28 PROCEDURE — 85025 COMPLETE CBC W/AUTO DIFF WBC: CPT

## 2020-04-28 PROCEDURE — 96374 THER/PROPH/DIAG INJ IV PUSH: CPT

## 2020-04-28 PROCEDURE — 80048 BASIC METABOLIC PNL TOTAL CA: CPT

## 2020-04-28 PROCEDURE — 74011250636 HC RX REV CODE- 250/636: Performed by: EMERGENCY MEDICINE

## 2020-04-28 PROCEDURE — 87086 URINE CULTURE/COLONY COUNT: CPT

## 2020-04-28 PROCEDURE — 74018 RADEX ABDOMEN 1 VIEW: CPT

## 2020-04-28 PROCEDURE — 81001 URINALYSIS AUTO W/SCOPE: CPT

## 2020-04-28 PROCEDURE — 74176 CT ABD & PELVIS W/O CONTRAST: CPT

## 2020-04-28 PROCEDURE — 96375 TX/PRO/DX INJ NEW DRUG ADDON: CPT

## 2020-04-28 PROCEDURE — 99284 EMERGENCY DEPT VISIT MOD MDM: CPT

## 2020-04-28 PROCEDURE — 96361 HYDRATE IV INFUSION ADD-ON: CPT

## 2020-04-28 RX ORDER — OXYCODONE AND ACETAMINOPHEN 5; 325 MG/1; MG/1
1 TABLET ORAL
Qty: 14 TAB | Refills: 0 | Status: SHIPPED | OUTPATIENT
Start: 2020-04-28 | End: 2020-05-01

## 2020-04-28 RX ORDER — TAMSULOSIN HYDROCHLORIDE 0.4 MG/1
0.4 CAPSULE ORAL DAILY
Qty: 10 CAP | Refills: 0 | Status: SHIPPED | OUTPATIENT
Start: 2020-04-28 | End: 2020-05-04 | Stop reason: ALTCHOICE

## 2020-04-28 RX ORDER — MORPHINE SULFATE 4 MG/ML
INJECTION, SOLUTION INTRAMUSCULAR; INTRAVENOUS
Status: DISCONTINUED
Start: 2020-04-28 | End: 2020-04-28 | Stop reason: HOSPADM

## 2020-04-28 RX ORDER — ONDANSETRON 2 MG/ML
4 INJECTION INTRAMUSCULAR; INTRAVENOUS
Status: COMPLETED | OUTPATIENT
Start: 2020-04-28 | End: 2020-04-28

## 2020-04-28 RX ORDER — ONDANSETRON 4 MG/1
4 TABLET, FILM COATED ORAL
Qty: 10 TAB | Refills: 0 | Status: SHIPPED | OUTPATIENT
Start: 2020-04-28 | End: 2020-05-11 | Stop reason: CLARIF

## 2020-04-28 RX ORDER — CEPHALEXIN 500 MG/1
500 CAPSULE ORAL 3 TIMES DAILY
Qty: 21 CAP | Refills: 0 | OUTPATIENT
Start: 2020-04-28 | End: 2020-04-28

## 2020-04-28 RX ORDER — KETOROLAC TROMETHAMINE 15 MG/ML
15 INJECTION, SOLUTION INTRAMUSCULAR; INTRAVENOUS
Status: COMPLETED | OUTPATIENT
Start: 2020-04-28 | End: 2020-04-28

## 2020-04-28 RX ORDER — MORPHINE SULFATE 10 MG/ML
6 INJECTION, SOLUTION INTRAMUSCULAR; INTRAVENOUS
Status: COMPLETED | OUTPATIENT
Start: 2020-04-28 | End: 2020-04-28

## 2020-04-28 RX ORDER — MORPHINE SULFATE 4 MG/ML
4 INJECTION, SOLUTION INTRAMUSCULAR; INTRAVENOUS
Status: COMPLETED | OUTPATIENT
Start: 2020-04-28 | End: 2020-04-28

## 2020-04-28 RX ADMIN — MORPHINE SULFATE 6 MG: 10 INJECTION, SOLUTION INTRAMUSCULAR; INTRAVENOUS at 08:59

## 2020-04-28 RX ADMIN — MORPHINE SULFATE 4 MG: 4 INJECTION, SOLUTION INTRAMUSCULAR; INTRAVENOUS at 08:11

## 2020-04-28 RX ADMIN — KETOROLAC TROMETHAMINE 15 MG: 15 INJECTION, SOLUTION INTRAMUSCULAR; INTRAVENOUS at 08:11

## 2020-04-28 RX ADMIN — ONDANSETRON 4 MG: 2 INJECTION INTRAMUSCULAR; INTRAVENOUS at 08:11

## 2020-04-28 RX ADMIN — SODIUM CHLORIDE 1000 ML: 900 INJECTION, SOLUTION INTRAVENOUS at 08:11

## 2020-04-28 NOTE — ED PROVIDER NOTES
EMERGENCY DEPARTMENT HISTORY AND PHYSICAL EXAM    8:02 AM      Date: 2020  Patient Name: Kate Hilton    History of Presenting Illness     Chief Complaint   Patient presents with   [place] Kidney Stone         History Provided By: Patient    Additional History (Context): Kate Hilton is a 44 y.o. male with Past medical history of kidney stones, dyslipidemia, hypertension who presents with chief complaint of right flank pain that woke him up at 2:30 AM.  Says it feels like another kidney stone. He reports that he has passed them in the past.  States the pain is moderate to severe and radiating from his flank into his right side of abdomen to the right groin region. States he had some vomiting episodes. No hematuria or dysuria. No fever,, shortness of breath, chest pain, weakness, numbness, cough, sick contacts, dizziness, no other complaint. PCP: Daniela Johns MD        Past History     Past Medical History:  Past Medical History:   Diagnosis Date    Calculus of kidney     Dr Brownlee Second ; ; ; ca oxalate in past    Cellulitis     Dr Bradley Cramer left leg    Dyslipidemia     Hypertension     IFG (impaired fasting glucose) 10/2011    2 hr gtt 160    Obesity     IF  start weight 224 lbs    Proptosis     right side, negative thyroid eval    Psoriasis        Past Surgical History:  Past Surgical History:   Procedure Laterality Date    HX APPENDECTOMY      HX HEENT      ear drum    HX ORTHOPAEDIC  2013    LEFT knee arthroscopy Dr Eva Yi       Family History:  Family History   Problem Relation Age of Onset    Hypertension Mother     Diabetes Mother     Cancer Father         bladder       Social History:  Social History     Tobacco Use    Smoking status: Former Smoker     Packs/day: 0.25     Years: 15.00     Pack years: 3.75     Last attempt to quit: 2003     Years since quittin.3    Smokeless tobacco: Never Used   Substance Use Topics    Alcohol use:  Yes Alcohol/week: 2.5 standard drinks     Types: 3 Glasses of wine per week     Comment: socially    Drug use: No       Allergies: Allergies   Allergen Reactions    Omnicef [Cefdinir] Diarrhea         Review of Systems       Review of Systems   Constitutional: Negative for chills and fever. HENT: Negative for congestion, rhinorrhea, sore throat and trouble swallowing. Eyes: Negative for visual disturbance. Respiratory: Negative for cough, shortness of breath and wheezing. Cardiovascular: Negative for chest pain and leg swelling. Gastrointestinal: Positive for abdominal pain (Right-sided radiating into right groin), nausea and vomiting. Endocrine: Negative for polyuria. Genitourinary: Positive for flank pain. Negative for difficulty urinating and dysuria. Musculoskeletal: Negative for arthralgias and neck stiffness. Skin: Negative for rash. Neurological: Negative for dizziness, weakness, numbness and headaches. Hematological: Does not bruise/bleed easily. Psychiatric/Behavioral: Negative for confusion and dysphoric mood. All other systems reviewed and are negative. Physical Exam     Visit Vitals  /76   Pulse 74   Temp 98.2 °F (36.8 °C)   Resp 17   Ht 5' 8\" (1.727 m)   Wt 99.8 kg (220 lb)   SpO2 100%   BMI 33.45 kg/m²         Physical Exam  Vitals signs and nursing note reviewed. Constitutional:       General: He is in acute distress. Appearance: He is well-developed. He is diaphoretic. Comments: Appears in pain   HENT:      Head: Normocephalic and atraumatic. Eyes:      General: No scleral icterus. Conjunctiva/sclera: Conjunctivae normal.      Pupils: Pupils are equal, round, and reactive to light. Neck:      Musculoskeletal: Normal range of motion and neck supple. Cardiovascular:      Rate and Rhythm: Normal rate. Comments: Capillary refill < 3 seconds  Pulmonary:      Effort: Pulmonary effort is normal. No respiratory distress.       Breath sounds: Normal breath sounds. No wheezing. Abdominal:      General: Bowel sounds are normal. There is no distension or abdominal bruit. Palpations: Abdomen is soft. There is no hepatomegaly, splenomegaly, mass or pulsatile mass. Tenderness: There is abdominal tenderness. There is guarding. There is no rebound. Negative signs include Mckeon's sign and McBurney's sign. Hernia: No hernia is present. Comments: Right lower quadrant and right inguinal tenderness to palpation   Genitourinary:     Comments: Right flank tenderness to palpation  Musculoskeletal: Normal range of motion. Lymphadenopathy:      Cervical: No cervical adenopathy. Skin:     General: Skin is warm. Neurological:      Mental Status: He is alert and oriented to person, place, and time. Cranial Nerves: No cranial nerve deficit. Diagnostic Study Results     Labs -  Recent Results (from the past 12 hour(s))   CBC WITH AUTOMATED DIFF    Collection Time: 04/28/20  7:54 AM   Result Value Ref Range    WBC 13.7 (H) 4.6 - 13.2 K/uL    RBC 5.40 4.70 - 5.50 M/uL    HGB 16.2 (H) 13.0 - 16.0 g/dL    HCT 46.7 36.0 - 48.0 %    MCV 86.5 74.0 - 97.0 FL    MCH 30.0 24.0 - 34.0 PG    MCHC 34.7 31.0 - 37.0 g/dL    RDW 12.3 11.6 - 14.5 %    PLATELET 518 992 - 198 K/uL    MPV 11.5 9.2 - 11.8 FL    NEUTROPHILS 87 (H) 40 - 73 %    LYMPHOCYTES 8 (L) 21 - 52 %    MONOCYTES 5 3 - 10 %    EOSINOPHILS 0 0 - 5 %    BASOPHILS 0 0 - 2 %    ABS. NEUTROPHILS 11.9 (H) 1.8 - 8.0 K/UL    ABS. LYMPHOCYTES 1.0 0.9 - 3.6 K/UL    ABS. MONOCYTES 0.7 0.05 - 1.2 K/UL    ABS. EOSINOPHILS 0.0 0.0 - 0.4 K/UL    ABS.  BASOPHILS 0.0 0.0 - 0.1 K/UL    DF AUTOMATED     METABOLIC PANEL, BASIC    Collection Time: 04/28/20  7:54 AM   Result Value Ref Range    Sodium 136 136 - 145 mmol/L    Potassium 4.2 3.5 - 5.5 mmol/L    Chloride 104 100 - 111 mmol/L    CO2 24 21 - 32 mmol/L    Anion gap 8 3.0 - 18 mmol/L    Glucose 182 (H) 74 - 99 mg/dL    BUN 15 7.0 - 18 MG/DL Creatinine 1.15 0.6 - 1.3 MG/DL    BUN/Creatinine ratio 13 12 - 20      GFR est AA >60 >60 ml/min/1.73m2    GFR est non-AA >60 >60 ml/min/1.73m2    Calcium 9.1 8.5 - 10.1 MG/DL   URINALYSIS W/ RFLX MICROSCOPIC    Collection Time: 04/28/20  9:20 AM   Result Value Ref Range    Color YELLOW      Appearance CLEAR      Specific gravity 1.024 1.005 - 1.030      pH (UA) 5.0 5.0 - 8.0      Protein TRACE (A) NEG mg/dL    Glucose Negative NEG mg/dL    Ketone TRACE (A) NEG mg/dL    Bilirubin Negative NEG      Blood Negative NEG      Urobilinogen 1.0 0.2 - 1.0 EU/dL    Nitrites Negative NEG      Leukocyte Esterase Negative NEG     URINE MICROSCOPIC ONLY    Collection Time: 04/28/20  9:20 AM   Result Value Ref Range    WBC 0 to 3 0 - 4 /hpf    RBC Negative 0 - 5 /hpf    Epithelial cells FEW 0 - 5 /lpf    Bacteria Negative NEG /hpf    Mucus FEW (A) NEG /lpf       Radiologic Studies -   CT ABD PELV WO CONT   Final Result   IMPRESSION:      1. Moderate right hydronephrosis and obstructing 5 mm calculus at the UPJ. Additional right kidney lower pole calyceal 5 mm calculus. 2. Mildly enlarged prostate gland. 3. Mild hepatomegaly. 4. Small umbilical and supraumbilical fat-containing hernias. Fat-containing   inguinal hernias. XR ABD (KUB)    (Results Pending)         Medical Decision Making   I am the first provider for this patient. I reviewed the vital signs, available nursing notes, past medical history, past surgical history, family history and social history. Vital Signs-Reviewed the patient's vital signs.         Records Reviewed: Nursing Notes and Old Medical Records (Time of Review: 8:02 AM)    Provider Notes (Medical Decision Making): DDX: Kidney stone, pyelonephritis, appendicitis, UTI, hernia, AAA, constipation, metabolic    Get CT abdomen pelvis, check labs, urine, give IV fluid, antiemetic, analgesia    BP elevated at 192/102 possibly associated with pain extremis    Patient is afebrile with normal pulse and respirations normal    MDM    Medications   morphine 4 mg/mL injection (has no administration in time range)   sodium chloride 0.9 % bolus infusion 1,000 mL (0 mL IntraVENous IV Completed 4/28/20 0900)   ketorolac (TORADOL) injection 15 mg (15 mg IntraVENous Given 4/28/20 0811)   ondansetron (ZOFRAN) injection 4 mg (4 mg IntraVENous Given 4/28/20 0811)   morphine injection 4 mg (4 mg IntraVENous Given 4/28/20 0811)   morphine 10 mg/ml injection 6 mg (6 mg IntraVENous Given 4/28/20 0859)         ED Course: Progress Notes, Reevaluation, and Consults:      Patient Vitals for the past 4 hrs:   Temp Pulse Resp BP SpO2   04/28/20 1112    164/76    04/28/20 0846  74 17 (!) 174/96 100 %   04/28/20 0747 98.2 °F (36.8 °C) 72 18 (!) 192/102 100 %       BP improved after pain is improving    WBC 13.7, likely stress phase  Blood glucose 182    RN notified me that patient reports pain is returning. We will give more analgesia    UA: Negative nitrites, negative bacteria, urine is cultured    Patient with 5 mm stone at the UPJ with moderate hydronephrosis    We will consult urology to ensure follow-up    Reassessed patient and pain is controlled. We will give him pain medication, nausea medication and antibiotic prescriptions    Consult:  Discussed care with Sondra Acevedo NP, Specialty: Call for urologist [facility] [place], standard discussion; including history of patients chief complaint, available diagnostic results, and treatment course. She wants a KUB before patient leaves the ED. She states patient can be discharged home, took down patient information, she will have office set him up for follow-up outpatient in 2 weeks, states tell patient if he has not heard from the office in a few days to call there. She agrees no antibiotics at this time, they will check urine culture, agrees with pain medication nausea medicine and Flomax. 10:31 AM, 4/28/2020       Diagnosis     Clinical Impression:   1.  Right ureteral stone 2. UPJ (ureteropelvic junction) obstruction    3. Hydronephrosis of right kidney    4. Acute right flank pain    5. Enlarged prostate    6. Umbilical hernia without obstruction and without gangrene    7. Inguinal hernia without obstruction or gangrene, recurrence not specified, unspecified laterality        Disposition: Discharged    Follow-up Information     Follow up With Specialties Details Why Contact Info    [facility]  Schedule an appointment as soon as possible for a visit in 3 days  [address]  [address] [facility] EMERGENCY DEPT Emergency Medicine  As needed, If symptoms worsen [address] [facility]  [phone]           Patient's Medications   Start Taking    ONDANSETRON HCL (ZOFRAN) 4 MG TABLET    Take 1 Tab by mouth every eight (8) hours as needed for Nausea, Vomiting or Nausea or Vomiting. OXYCODONE-ACETAMINOPHEN (PERCOCET) 5-325 MG PER TABLET    Take 1 Tab by mouth every four (4) hours as needed for Pain for up to 3 days. Max Daily Amount: 6 Tabs. TAMSULOSIN (FLOMAX) 0.4 MG CAPSULE    Take 1 Cap by mouth daily for 10 days. Indications: stones in the urinary tract   Continue Taking    ASPIRIN 81 MG TABLET    Take 81 mg by mouth. ATORVASTATIN (LIPITOR) 10 MG TABLET    TAKE 1 TABLET BY MOUTH EVERY DAY    ERGOCALCIFEROL (ERGOCALCIFEROL) 1,250 MCG (50,000 UNIT) CAPSULE    TAKE ONE CAPSULE BY MOUTH ONE TIME PER WEEK    LISINOPRIL (PRINIVIL, ZESTRIL) 10 MG TABLET    TAKE ONE TABLET BY MOUTH ONE TIME DAILY FOR BLOOD PRESSURE   These Medications have changed    No medications on file   Stop Taking    No medications on file         DO Abeba Holloway medical dictation software was used for portions of this report. Unintended transcription errors may occur.      My signature above authenticates this document and my orders, the final    diagnosis (es), discharge prescription (s), and instructions in the Epic record.

## 2020-04-28 NOTE — DISCHARGE INSTRUCTIONS
Patient Education      If you were prescribed any medication take as directed. Do not drive or use heavy equipment if prescribed narcotics. Follow up with your primary care physician or with specialist as directed. Return to the emergency room with any new or worsening conditions. Learning About Hydronephrosis  What is hydronephrosis? Hydronephrosis is swelling of the kidneys. It is caused by a buildup of urine. This condition can happen if a tube that drains urine from your kidneys is blocked. The blockage can come from within the urinary tract or from pressure outside of the tract. Pregnancy is an example of an outside (external) cause. This condition is often caused by a blockage such as a kidney stone, tumor, or blood clot. It also can be caused by a problem in your urinary system that you were born with (congenital problem). What are the symptoms? Some of the common symptoms are:  · Pain in one or both sides. · Stomach pain. · Blood in your urine. Some people have no symptoms. How is it diagnosed? Your doctor will do an ultrasound to look for a blockage in your urinary system. An ultrasound allows your doctor to see a picture of the organs and other structures in your belly (abdomen). You also may need blood and urine tests. How is it treated? Your treatment depends on the cause of the swelling. If it is caused by a blockage, your treatment will depend on the type of blockage you have. If the blockage is caused by a kidney stone, you may wait for the stone to pass. If hydronephrosis happens during pregnancy, it usually clears up on its own. You may need to have urine drained from your bladder or kidneys. A urinary catheter is a small, flexible tube that can be inserted through the urethra and into the bladder, allowing urine to drain. A nephrostomy catheter is a thin tube placed into your kidney to drain urine. Sometimes surgery is needed to clear the blockage.   If you have a blockage, you should begin to feel better after the blockage is gone. Many people recover and have no long-term problems. But some may have kidney damage. If hydronephrosis was left untreated for a long time, the damage can be severe. Severe damage will require further treatment. Follow-up care is a key part of your treatment and safety. Be sure to make and go to all appointments, and call your doctor if you are having problems. It's also a good idea to know your test results and keep a list of the medicines you take. Where can you learn more? Go to http://linda-alex.info/  Enter S386 in the search box to learn more about \"Learning About Hydronephrosis. \"  Current as of: August 11, 2019Content Version: 12.4  © 7729-9515 [employer]. Care instructions adapted under license by [employer] (which disclaims liability or warranty for this information). If you have questions about a medical condition or this instruction, always ask your healthcare professional. [place] [address] any warranty or liability for your use of this information. Patient Education        Flank Pain: Care Instructions  Your Care Instructions  Flank pain is pain on the side of the back just below the rib cage and above the waist. It can be on one or both sides. Flank pain has many possible causes, including a kidney stone, a urinary tract infection, or back strain. Flank pain may get better on its own. But don't ignore new symptoms, such as fever, nausea and vomiting, urination problems, pain that gets worse, and dizziness. These may be signs of a more serious problem. You may have to have tests or other treatment. Follow-up care is a key part of your treatment and safety. Be sure to make and go to all appointments, and call your doctor if you are having problems. It's also a good idea to know your test results and keep a list of the medicines you take.   How can you care for yourself at home?  · Rest until you feel better. · Take pain medicines exactly as directed. ? If the doctor gave you a prescription medicine for pain, take it as prescribed. ? If you are not taking a prescription pain medicine, ask your doctor if you can take an over-the-counter pain medicine, such as acetaminophen (Tylenol), ibuprofen (Advil, Motrin), or naproxen (Aleve). Read and follow all instructions on the label. · If your doctor prescribed antibiotics, take them as directed. Do not stop taking them just because you feel better. You need to take the full course of antibiotics. · To apply heat, put a warm water bottle, a heating pad set on low, or a warm cloth on the painful area. Do not go to sleep with a heating pad on your skin. · To prevent dehydration, drink plenty of fluids, enough so that your urine is light yellow or clear like water. Choose water and other caffeine-free clear liquids until you feel better. If you have kidney, heart, or liver disease and have to limit fluids, talk with your doctor before you increase the amount of fluids you drink. When should you call for help? Call your doctor now or seek immediate medical care if:    · Your flank pain gets worse.     · You have new symptoms, such as fever, nausea, or vomiting.     · You have symptoms of a urinary problem. For example:  ? You have blood or pus in your urine. ? You have chills or body aches. ? It hurts to urinate. ? You have groin or belly pain.    Watch closely for changes in your health, and be sure to contact your doctor if you do not get better as expected. Where can you learn more? Go to http://linda-alex.info/  Enter S191 in the search box to learn more about \"Flank Pain: Care Instructions. \"  Current as of: June 26, 2019Content Version: 12.4  © 4533-3555 [employer]. Care instructions adapted under license by [employer] (which disclaims liability or warranty for this information). If you have questions about a medical condition or this instruction, always ask your healthcare professional. [address] any warranty or liability for your use of this information. Patient Education        Kidney Stone: Care Instructions  Your Care Instructions    Kidney stones are formed when salts, minerals, and other substances normally found in the urine clump together. They can be as small as grains of sand or, rarely, as large as golf balls. While the stone is traveling through the ureter, which is the tube that carries urine from the kidney to the bladder, you will probably feel pain. The pain may be mild or very severe. You may also have some blood in your urine. As soon as the stone reaches the bladder, any intense pain should go away. If a stone is too large to pass on its own, you may need a medical procedure to help you pass the stone. The doctor has checked you carefully, but problems can develop later. If you notice any problems or new symptoms, get medical treatment right away. Follow-up care is a key part of your treatment and safety. Be sure to make and go to all appointments, and call your doctor if you are having problems. It's also a good idea to know your test results and keep a list of the medicines you take. How can you care for yourself at home? · Drink plenty of fluids, enough so that your urine is light yellow or clear like water. If you have kidney, heart, or liver disease and have to limit fluids, talk with your doctor before you increase the amount of fluids you drink. · Take pain medicines exactly as directed. Call your doctor if you think you are having a problem with your medicine. ? If the doctor gave you a prescription medicine for pain, take it as prescribed. ? If you are not taking a prescription pain medicine, ask your doctor if you can take an over-the-counter medicine. Read and follow all instructions on the label.   · Your doctor may ask you to strain your urine so that you can collect your kidney stone when it passes. You can use a kitchen strainer or a tea strainer to catch the stone. Store it in a plastic bag until you see your doctor again. Preventing future kidney stones  Some changes in your diet may help prevent kidney stones. Depending on the cause of your stones, your doctor may recommend that you:  · Drink plenty of fluids, enough so that your urine is light yellow or clear like water. If you have kidney, heart, or liver disease and have to limit fluids, talk with your doctor before you increase the amount of fluids you drink. · Limit coffee, tea, and alcohol. Also avoid grapefruit juice. · Do not take more than the recommended daily dose of vitamins C and D.  · Avoid antacids such as Gaviscon, Maalox, Mylanta, or Tums. · Limit the amount of salt (sodium) in your diet. · Eat a balanced diet that is not too high in protein. · Limit foods that are high in a substance called oxalate, which can cause kidney stones. These foods include dark green vegetables, rhubarb, chocolate, wheat bran, nuts, cranberries, and beans. When should you call for help? Call your doctor now or seek immediate medical care if:    · You cannot keep down fluids.     · Your pain gets worse.     · You have a fever or chills.     · You have new or worse pain in your back just below your rib cage (the flank area).     · You have new or more blood in your urine.    Watch closely for changes in your health, and be sure to contact your doctor if:    · You do not get better as expected. Where can you learn more? Go to http://linda-alex.info/  Enter F877 in the search box to learn more about \"Kidney Stone: Care Instructions. \"  Current as of: August 11, 2019Content Version: 12.4  © 1528-6968 Healthwise, Incorporated. Care instructions adapted under license by [employer] (which disclaims liability or warranty for this information).  If you have questions about a medical condition or this instruction, always ask your healthcare professional. [place] [address] any warranty or liability for your use of this information.

## 2020-04-28 NOTE — ED NOTES
I have reviewed discharge instructions with the patient. The patient verbalized understanding. Patient armband removed and shredded. Pt ambulatory to waiting area to await arrival of his wife who is driving him. Pt was instructed not to drive; he verbalized understanding.

## 2020-04-29 LAB
BACTERIA SPEC CULT: NORMAL
SERVICE CMNT-IMP: NORMAL

## 2020-08-01 LAB — SARS-COV-2, NAA: NOT DETECTED

## 2020-09-08 ENCOUNTER — OFFICE VISIT (OUTPATIENT)
Dept: INTERNAL MEDICINE CLINIC | Age: 50
End: 2020-09-08

## 2020-09-08 VITALS
SYSTOLIC BLOOD PRESSURE: 147 MMHG | TEMPERATURE: 96.8 F | OXYGEN SATURATION: 94 % | BODY MASS INDEX: 32.96 KG/M2 | WEIGHT: 235.4 LBS | DIASTOLIC BLOOD PRESSURE: 98 MMHG | RESPIRATION RATE: 14 BRPM | HEIGHT: 71 IN | HEART RATE: 84 BPM

## 2020-09-08 DIAGNOSIS — E66.9 OBESITY (BMI 30.0-34.9): ICD-10-CM

## 2020-09-08 DIAGNOSIS — I10 PRIMARY HYPERTENSION: ICD-10-CM

## 2020-09-08 DIAGNOSIS — N20.0 NEPHROLITHIASIS: ICD-10-CM

## 2020-09-08 DIAGNOSIS — E78.5 DYSLIPIDEMIA: ICD-10-CM

## 2020-09-08 DIAGNOSIS — Z00.00 PHYSICAL EXAM: Primary | ICD-10-CM

## 2020-09-08 DIAGNOSIS — Z12.11 ENCOUNTER FOR SCREENING COLONOSCOPY: ICD-10-CM

## 2020-09-08 DIAGNOSIS — R06.09 DOE (DYSPNEA ON EXERTION): ICD-10-CM

## 2020-09-08 DIAGNOSIS — R73.01 IFG (IMPAIRED FASTING GLUCOSE): ICD-10-CM

## 2020-09-08 RX ORDER — LISINOPRIL 20 MG/1
20 TABLET ORAL DAILY
Qty: 90 TAB | Refills: 3 | Status: SHIPPED | OUTPATIENT
Start: 2020-09-08 | End: 2021-09-24

## 2020-09-08 RX ORDER — AMLODIPINE BESYLATE 5 MG/1
5 TABLET ORAL DAILY
Qty: 30 TAB | Refills: 5 | Status: SHIPPED | OUTPATIENT
Start: 2020-09-08 | End: 2021-01-29

## 2020-09-08 NOTE — PROGRESS NOTES
44 y.o. WHITE OR  male who presents for RPE    He's had 'a rough 2020' mainly with kidney stones. He had to have laser litho in May but seems to be doing better there    He has a 'blood pressure problem' as well. Averaging 145/90s but sometimes goes as high as 160/120!!. On his own, he inc the huber to 21 but 'did not touch it' so interested in adding the next med    No cp but he has been having rich. No pnd, orthopnea, edema. He's had several ekgs with his urologic visits and was told they were ok, not available for review unfortunately. 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 Not checking sugars at this time. He lost some weight on IF but regained it back coming off    No gi complaints.   He was scheduled for colo at [place] [id number] then the pandemic hit    Past Medical History:   Diagnosis Date    Cellulitis 1/14    Dr Cornelia Kothari left leg    Dyslipidemia     Hypertension     IFG (impaired fasting glucose) 10/2011    2 hr gtt 160    Inguinal hernia     Nephrolithiasis     Dr Tj Saldivar 2008; 11/16; 7/18; ca oxalate in past; 4/20 w RIGHT hydronephrosis s/p laser litho and URS Dr Irma Edwards    Obesity     IF 1/19 start weight 224 lbs    Proptosis     RIGHT side, negative thyroid eval    Psoriasis     Umbilical hernia 05/6854     Past Surgical History:   Procedure Laterality Date    HX APPENDECTOMY      HX HEENT      ear drum    HX ORTHOPAEDIC  12/2013    LEFT knee arthroscopy Dr Matthew Grimes     Social History     Socioeconomic History    Marital status:      Spouse name: Not on file    Number of children: Not on file    Years of education: Not on file    Highest education level: Not on file   Occupational History    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[employer]; part owner [employer]   Social Needs    Financial resource strain: Not on file    Food insecurity     Worry: Not on file     Inability: Not on file   [employer] needs Medical: Not on file     Non-medical: Not on file   Tobacco Use    Smoking status: Former Smoker     Packs/day: 0.25     Years: 15.00     Pack years: 3.75     Last attempt to quit: 2003     Years since quittin.6    Smokeless tobacco: Never Used   Substance and Sexual Activity    Alcohol use: Yes     Alcohol/week: 2.5 standard drinks     Types: 3 Glasses of wine per week     Comment: 2-3x a week     Drug use: No    Sexual activity: Not on file   Lifestyle    Physical activity     Days per week: Not on file     Minutes per session: Not on file    Stress: Not on file   Relationships    Social connections     Talks on phone: Not on file     Gets together: Not on file     Attends Mosque service: Not on file     Active member of club or organization: Not on file     Attends meetings of clubs or organizations: Not on file     Relationship status: Not on file    Intimate partner violence     Fear of current or ex partner: Not on file     Emotionally abused: Not on file     Physically abused: Not on file     Forced sexual activity: Not on file   Other Topics Concern    Not on file   Social History Narrative    Not on file     Current Outpatient Medications   Medication Sig    lisinopriL (PRINIVIL, ZESTRIL) 20 mg tablet Take 1 Tab by mouth daily.  amLODIPine (NORVASC) 5 mg tablet Take 1 Tab by mouth daily.  acetaminophen (TylenoL) 325 mg tablet Take 1,000 mg by mouth as needed for Pain.  ibuprofen (MOTRIN) 800 mg tablet Take 1 Tab by mouth every eight (8) hours as needed for Pain.  atorvastatin (LIPITOR) 10 mg tablet TAKE 1 TABLET BY MOUTH EVERY DAY    ergocalciferol (ERGOCALCIFEROL) 1,250 mcg (50,000 unit) capsule TAKE ONE CAPSULE BY MOUTH ONE TIME PER WEEK    aspirin 81 mg tablet Take 81 mg by mouth.  tamsulosin (FLOMAX) 0.4 mg capsule Take 1 Cap by mouth daily (after dinner). No current facility-administered medications for this visit.       Allergies   Allergen Reactions    Omnicef [Cefdinir] Diarrhea     REVIEW OF SYSTEMS:   Ophtho  no vision change or eye pain  Oral  no mouth pain, tongue or tooth problems  Ears  no hearing loss, ear pain, fullness, no swallowing problems  Cardiac  no CP, PND, orthopnea, edema, palpitations or syncope  Chest  no breast masses  Resp  no wheezing, chronic coughing, dyspnea  GI  no heartburn, nausea, vomiting, change in bowel habits, bleeding, hemorrhoids  Urinary  no dysuria, hematuria, flank pain, urgency, frequency    Visit Vitals  BP (!) 147/98   Pulse 84   Temp 96.8 °F (36 °C) (Temporal)   Resp 14   Ht 5' 11\" (1.803 m)   Wt 235 lb 6.4 oz (106.8 kg)   SpO2 94%   BMI 32.83 kg/m²   Affect is appropriate. Mood stable  No apparent distress  HEENT --Anicteric sclerae, tympanic membranes normal,  ear canals normal.  PERRL, EOMI, conjunctiva and lids normal.  Disks were sharp  Sinuses were nontender, turbinates normal, hearing normal.  Oropharynx without  erythema, normal tongue, oral mucosa and tonsils. No thyromegaly, JVD, or bruits. Lungs --Clear to auscultation, normal percussion. Heart --Regular rate and rhythm, no murmurs, rubs, gallops, or clicks. Chest wall --Nontender to palpation. PMI normal.  Abdomen -- Soft and nontender, no hepatosplenomegaly or masses.y  Extremities -- Without cyanosis, clubbing, edema. 2+ pulses equally and bilaterally.     LABS  From 9/06 showed   gluc 98,   cr 1.10,      alt 63,       chol 124, tg 323, hdl 28, ldl-c 31, wbc 5.7, hb 15.7, plt 258, tsh 1.86, ua neg,    ast 40  From 11/09 showed gluc 96,   cr 1.20, gfr>60,  alt 66,       chol 125, tg 122, hdl 33, ldl-c 68  From 10/11 showed gluc 103, cr 1.14, gfr 79,   alt 42,          ldl-p 1106,                       tsh 1.80  From 3/12 showed   gluc 102, cr 0.87, gfr 106, alt 43, hba1c 5.7, ldl-p 528,   chol 102, tg 170, hdl 36, ldl-c 32  From 1/14 showed   gluc 99,   cr 0.93, gfr>60,                 wbc 9.5, hb 14.7, plt 326  From 2/15 showed   gluc 111, cr 0.84, gfr 105, alt 34, hba1c 5.7,      chol 108, tg 202, hdl 34, ldl-c 34, wbc 6.3, hb 15.3, plt 287,       ua neg  From 2/15 showed                   tsh 1.50, vit d 18.2, uric 5.0, hep b/c neg, ra neg, jeane neg, ccp neg, esr 2  From 10/15 showed         hba1c 5.6,      chol 92,   tg 141, hdl 31, ldl-c 33  From 2/16 showed   gluc 98,   cr 0.82, gfr>60,  alt 40,       chol 96,   tg 239, hdl 34, ldl-c 48, wbc 8.3, hb 15.5, plt 258, tsh 1.11, ua neg  From 3/16 showed                              ra neg, jeane neg, ccp neg, esr 2, crp 0.3  From 11/16 showed gluc 149, cr 1.30, gfr 59,   alt 51,                wbc 8.5, hb 15.1, plt 266,             lip 175  From 2/17 showed   gluc 114, cr 0.90, gfr 104,     hba1c 5.7,      chol 98,   tg 174, hdl 29, ldl-c 34,                  tsh 1.53, vit d 34.2  From 1/19 showed   gluc 109, cr 0.94, gfr>60,  alt 43, hba1c 5.5,     chol 90,   tg 141, hdl 33, ldl-c 27, wbc 6.9, hb 16.1, plt 230,       vit d 28.4, psa 0.50  From 4/20 showed   gluc 182, cr 1.15, gfr>60,                 wb 13.7, hb 16.2, plt 245    We reviewed the patient's labs from the last several visits to point out trends in the numbers      Patient Active Problem List   Diagnosis Code    Dyslipidemia E78.5    IFG (impaired fasting glucose) R73.01    Hypovitaminosis D E55.9    Obesity (BMI 30.0-34. 9) E66.9    Primary hypertension I10    Nephrolithiasis Dr Alonzo Friends N20.0     Assessment and plan:  1. Dyslipidemia. Continue lipitor  2. Prediabetes. Lifestyle and dietary measures, wt loss would be ideal.   3.  HTN. Continue huber 20; start amlo 5 every day after discussing possible sfx; he will call in readings and we'll adjust as indicated  4. Obesity. Lifestyle and dietary measures, portion control. Intermittent fasting   5. Rheum. Observation  6. Hypovit D.  supplementation  7. Nephrolithiasis. Hydration and f/u urology  8. HOLMAN.   Check cxr and NST, further recs pending results        RTC 1/21    Above conditions discussed at length and patient vocalized understanding.   All questions answered to patient satisfaction

## 2020-09-16 ENCOUNTER — HOSPITAL ENCOUNTER (OUTPATIENT)
Dept: GENERAL RADIOLOGY | Age: 50
Discharge: HOME | End: 2020-09-16
Payer: COMMERCIAL

## 2020-09-16 ENCOUNTER — HOSPITAL ENCOUNTER (OUTPATIENT)
Dept: LAB | Age: 50
Discharge: HOME | End: 2020-09-16
Payer: COMMERCIAL

## 2020-09-16 DIAGNOSIS — Z00.00 PHYSICAL EXAM: ICD-10-CM

## 2020-09-16 DIAGNOSIS — R06.09 DOE (DYSPNEA ON EXERTION): ICD-10-CM

## 2020-09-16 LAB
ALBUMIN SERPL-MCNC: 4.3 G/DL (ref 3.4–5)
ALBUMIN/GLOB SERPL: 1.3 {RATIO} (ref 0.8–1.7)
ALP SERPL-CCNC: 76 U/L (ref 45–117)
ALT SERPL-CCNC: 51 U/L (ref 16–61)
ANION GAP SERPL CALC-SCNC: 4 MMOL/L (ref 3–18)
AST SERPL-CCNC: 31 U/L (ref 10–38)
BILIRUB SERPL-MCNC: 0.4 MG/DL (ref 0.2–1)
BUN SERPL-MCNC: 17 MG/DL (ref 7–18)
BUN/CREAT SERPL: 18 (ref 12–20)
CALCIUM SERPL-MCNC: 9 MG/DL (ref 8.5–10.1)
CHLORIDE SERPL-SCNC: 108 MMOL/L (ref 100–111)
CHOLEST SERPL-MCNC: 109 MG/DL
CO2 SERPL-SCNC: 29 MMOL/L (ref 21–32)
CREAT SERPL-MCNC: 0.93 MG/DL (ref 0.6–1.3)
GLOBULIN SER CALC-MCNC: 3.3 G/DL (ref 2–4)
GLUCOSE SERPL-MCNC: 118 MG/DL (ref 74–99)
HBA1C MFR BLD: 5.8 % (ref 4.2–5.6)
HDLC SERPL-MCNC: 35 MG/DL (ref 40–60)
HDLC SERPL: 3.1 {RATIO} (ref 0–5)
LDLC SERPL CALC-MCNC: 28.6 MG/DL (ref 0–100)
LIPID PROFILE,FLP: ABNORMAL
POTASSIUM SERPL-SCNC: 4.5 MMOL/L (ref 3.5–5.5)
PROT SERPL-MCNC: 7.6 G/DL (ref 6.4–8.2)
SODIUM SERPL-SCNC: 141 MMOL/L (ref 136–145)
TRIGL SERPL-MCNC: 227 MG/DL (ref ?–150)
VLDLC SERPL CALC-MCNC: 45.4 MG/DL

## 2020-09-16 PROCEDURE — 36415 COLL VENOUS BLD VENIPUNCTURE: CPT

## 2020-09-16 PROCEDURE — 83036 HEMOGLOBIN GLYCOSYLATED A1C: CPT

## 2020-09-16 PROCEDURE — 80061 LIPID PANEL: CPT

## 2020-09-16 PROCEDURE — 71046 X-RAY EXAM CHEST 2 VIEWS: CPT

## 2020-09-16 PROCEDURE — 80053 COMPREHEN METABOLIC PANEL: CPT

## 2020-09-20 ENCOUNTER — TELEPHONE (OUTPATIENT)
Dept: INTERNAL MEDICINE CLINIC | Age: 50
End: 2020-09-20

## 2020-09-21 NOTE — TELEPHONE ENCOUNTER
pls call    Results for orders placed or performed during the hospital encounter of 80/17/95   METABOLIC PANEL, COMPREHENSIVE   Result Value Ref Range    Sodium 141 136 - 145 mmol/L    Potassium 4.5 3.5 - 5.5 mmol/L    Chloride 108 100 - 111 mmol/L    CO2 29 21 - 32 mmol/L    Anion gap 4 3.0 - 18 mmol/L    Glucose 118 (H) 74 - 99 mg/dL    BUN 17 7.0 - 18 MG/DL    Creatinine 0.93 0.6 - 1.3 MG/DL    BUN/Creatinine ratio 18 12 - 20      GFR est AA >60 >60 ml/min/1.73m2    GFR est non-AA >60 >60 ml/min/1.73m2    Calcium 9.0 8.5 - 10.1 MG/DL    Bilirubin, total 0.4 0.2 - 1.0 MG/DL    ALT (SGPT) 51 16 - 61 U/L    AST (SGOT) 31 10 - 38 U/L    Alk.  phosphatase 76 45 - 117 U/L    Protein, total 7.6 6.4 - 8.2 g/dL    Albumin 4.3 3.4 - 5.0 g/dL    Globulin 3.3 2.0 - 4.0 g/dL    A-G Ratio 1.3 0.8 - 1.7     LIPID PANEL   Result Value Ref Range    LIPID PROFILE          Cholesterol, total 109 <200 MG/DL    Triglyceride 227 (H) <150 MG/DL    HDL Cholesterol 35 (L) 40 - 60 MG/DL    LDL, calculated 28.6 0 - 100 MG/DL    VLDL, calculated 45.4 MG/DL    CHOL/HDL Ratio 3.1 0 - 5.0     HEMOGLOBIN A1C W/O EAG   Result Value Ref Range    Hemoglobin A1c 5.8 (H) 4.2 - 5.6 %     Gluc 118, a1c 5.8 - prediabetes  tg high, hdl low - exercise and diet, will recheck next visit      CXR negative as well

## 2021-02-09 DIAGNOSIS — M25.50 ARTHRALGIA: ICD-10-CM

## 2021-02-09 DIAGNOSIS — E55.9 HYPOVITAMINOSIS D: ICD-10-CM

## 2021-02-10 RX ORDER — ERGOCALCIFEROL 1.25 MG/1
CAPSULE ORAL
Qty: 12 CAP | Refills: 3 | Status: SHIPPED | OUTPATIENT
Start: 2021-02-10

## 2021-03-18 DIAGNOSIS — D12.6 COLON ADENOMA: ICD-10-CM

## 2021-03-21 PROBLEM — D12.6 COLON ADENOMA: Status: ACTIVE | Noted: 2021-03-21

## 2021-09-24 DIAGNOSIS — I10 PRIMARY HYPERTENSION: ICD-10-CM

## 2021-09-24 RX ORDER — LISINOPRIL 20 MG/1
TABLET ORAL
Qty: 90 TABLET | Refills: 0 | Status: SHIPPED | OUTPATIENT
Start: 2021-09-24 | End: 2021-10-13

## 2021-10-07 ENCOUNTER — HOSPITAL ENCOUNTER (OUTPATIENT)
Dept: LAB | Age: 51
Discharge: HOME | End: 2021-10-07
Payer: COMMERCIAL

## 2021-10-07 DIAGNOSIS — E78.5 DYSLIPIDEMIA: ICD-10-CM

## 2021-10-07 DIAGNOSIS — R73.01 IFG (IMPAIRED FASTING GLUCOSE): ICD-10-CM

## 2021-10-07 DIAGNOSIS — E78.5 DYSLIPIDEMIA: Primary | ICD-10-CM

## 2021-10-07 DIAGNOSIS — I10 PRIMARY HYPERTENSION: ICD-10-CM

## 2021-10-07 LAB
ALBUMIN SERPL-MCNC: 4.2 G/DL (ref 3.4–5)
ALBUMIN/GLOB SERPL: 1.2 {RATIO} (ref 0.8–1.7)
ALP SERPL-CCNC: 69 U/L (ref 45–117)
ALT SERPL-CCNC: 50 U/L (ref 16–61)
ANION GAP SERPL CALC-SCNC: 6 MMOL/L (ref 3–18)
AST SERPL-CCNC: 31 U/L (ref 10–38)
BASOPHILS # BLD: 0 K/UL (ref 0–0.1)
BASOPHILS NFR BLD: 1 % (ref 0–2)
BILIRUB SERPL-MCNC: 0.6 MG/DL (ref 0.2–1)
BUN SERPL-MCNC: 12 MG/DL (ref 7–18)
BUN/CREAT SERPL: 13 (ref 12–20)
CALCIUM SERPL-MCNC: 9.2 MG/DL (ref 8.5–10.1)
CHLORIDE SERPL-SCNC: 107 MMOL/L (ref 100–111)
CHOLEST SERPL-MCNC: 105 MG/DL
CO2 SERPL-SCNC: 27 MMOL/L (ref 21–32)
CREAT SERPL-MCNC: 0.95 MG/DL (ref 0.6–1.3)
DIFFERENTIAL METHOD BLD: NORMAL
EOSINOPHIL # BLD: 0.1 K/UL (ref 0–0.4)
EOSINOPHIL NFR BLD: 1 % (ref 0–5)
ERYTHROCYTE [DISTWIDTH] IN BLOOD BY AUTOMATED COUNT: 12 % (ref 11.6–14.5)
GLOBULIN SER CALC-MCNC: 3.4 G/DL (ref 2–4)
GLUCOSE SERPL-MCNC: 130 MG/DL (ref 74–99)
HBA1C MFR BLD: 5.8 % (ref 4.2–5.6)
HCT VFR BLD AUTO: 46.4 % (ref 36–48)
HDLC SERPL-MCNC: 36 MG/DL (ref 40–60)
HDLC SERPL: 2.9 {RATIO} (ref 0–5)
HGB BLD-MCNC: 15.6 G/DL (ref 13–16)
LDLC SERPL CALC-MCNC: 21.6 MG/DL (ref 0–100)
LIPID PROFILE,FLP: ABNORMAL
LYMPHOCYTES # BLD: 1.9 K/UL (ref 0.9–3.6)
LYMPHOCYTES NFR BLD: 31 % (ref 21–52)
MCH RBC QN AUTO: 29.3 PG (ref 24–34)
MCHC RBC AUTO-ENTMCNC: 33.6 G/DL (ref 31–37)
MCV RBC AUTO: 87.1 FL (ref 78–100)
MONOCYTES # BLD: 0.6 K/UL (ref 0.05–1.2)
MONOCYTES NFR BLD: 10 % (ref 3–10)
NEUTS SEG # BLD: 3.5 K/UL (ref 1.8–8)
NEUTS SEG NFR BLD: 58 % (ref 40–73)
PLATELET # BLD AUTO: 256 K/UL (ref 135–420)
PMV BLD AUTO: 11.8 FL (ref 9.2–11.8)
POTASSIUM SERPL-SCNC: 4.3 MMOL/L (ref 3.5–5.5)
PROT SERPL-MCNC: 7.6 G/DL (ref 6.4–8.2)
RBC # BLD AUTO: 5.33 M/UL (ref 4.35–5.65)
SODIUM SERPL-SCNC: 140 MMOL/L (ref 136–145)
TRIGL SERPL-MCNC: 237 MG/DL (ref ?–150)
VLDLC SERPL CALC-MCNC: 47.4 MG/DL
WBC # BLD AUTO: 6.1 K/UL (ref 4.6–13.2)

## 2021-10-07 PROCEDURE — 80061 LIPID PANEL: CPT

## 2021-10-07 PROCEDURE — 85025 COMPLETE CBC W/AUTO DIFF WBC: CPT

## 2021-10-07 PROCEDURE — 36415 COLL VENOUS BLD VENIPUNCTURE: CPT

## 2021-10-07 PROCEDURE — 80053 COMPREHEN METABOLIC PANEL: CPT

## 2021-10-07 PROCEDURE — 83036 HEMOGLOBIN GLYCOSYLATED A1C: CPT

## 2021-10-08 NOTE — PROGRESS NOTES
44 y.o. WHITE/NON- male who presents for RPE    The kidney stones have been quiescent this past year    At the last visit, we added amlo and his pressures controlled and no side effects reported. Still not much time for exercise mainly because of his busy schedule. Denies polyuria, polydipsia, nocturia, vision change. Not checking sugars at this time. He has been unable to lose weight but admits that diet is off. He generally does not eat breakfast, first meal is in the afternoon but usually fast foods. Then he \"grazes\" at night. Not watching his carbs in particular. Vitals 10/13/2021 8/26/2021 1/6/2021 9/8/2020   Weight 239 lb 230 lb 230 lb 235 lb 6.4 oz     No gi complaints, colonoscopy as below.   He now reports family history of colon cancer    Past Medical History:   Diagnosis Date    Cellulitis 1/14    Dr Karsten Ortiz left leg    Colon adenoma 03/01/2021    Dr Porter Tran and hyperplastic    Dyslipidemia     Hypertension     IFG (impaired fasting glucose) 10/2011    2 hr gtt 160    Inguinal hernia     Nephrolithiasis     Dr Armando Mario 2008; 11/16; 7/18; ca oxalate in past; 4/20 w RIGHT hydronephrosis s/p laser litho and URS Dr Rosalinda Eastman    Obesity     IF 1/19 start weight 224 lbs    Proptosis     RIGHT side, negative thyroid eval    Psoriasis     Umbilical hernia 47/4322     Past Surgical History:   Procedure Laterality Date    HX APPENDECTOMY      HX COLONOSCOPY      Dr Porter Tran 3/1/21 hyperplastic and adenoma    HX KNEE ARTHROSCOPY Left 12/2013    Dr Cotton Needle TYMPANOSTOMY       Social History     Socioeconomic History    Marital status:      Spouse name: Not on file    Number of children: Not on file    Years of education: Not on file    Highest education level: Not on file   Occupational History    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[employer]; 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[employer]   Tobacco Use    Smoking status: Former Smoker     Packs/day: 0.25     Years: 15.00     Pack years: 3.75     Quit date: 1/1/2003 Years since quittin.7    Smokeless tobacco: Never Used   Substance and Sexual Activity    Alcohol use: Yes     Alcohol/week: 2.5 standard drinks     Types: 3 Glasses of wine per week     Comment: 2-3x a week     Drug use: No    Sexual activity: Not on file   Other Topics Concern    Not on file   Social History Narrative    Not on file     Social Determinants of Health     Financial Resource Strain:     Difficulty of Paying Living Expenses:    Food Insecurity:     Worried About Running Out of Food in the Last Year:     [address] in the Last Year:    Transportation Needs:     Lack of Transportation (Medical):      Lack of Transportation (Non-Medical):    Physical Activity:     Days of Exercise per Week:     Minutes of Exercise per Session:    Stress:     Feeling of Stress :    Social Connections:     Frequency of Communication with Friends and Family:     Frequency of Social Gatherings with Friends and Family:     Attends Orthodox Services:     Active Member of Clubs or Organizations:     Attends Club or Organization Meetings:     Marital Status:    Intimate Partner Violence:     Fear of Current or Ex-Partner:     Emotionally Abused:     Physically Abused:     Sexually Abused:      Family History   Problem Relation Age of Onset    Hypertension Mother     Diabetes Mother     Cancer Father         bladder    Colon Cancer Paternal Grandmother      Immunization History   Administered Date(s) Administered    COVID-19, PFIZER, MRNA, LNP-S, PF, 30MCG/0.3ML DOSE 2021, 2021    Influenza Vaccine 2016, 2016, 2017, 2018    Influenza Vaccine (Quad) PF (>6 Mo Flulaval, Fluarix, and >3 Yrs Afluria, Fluzone 68098) 10/04/2017, 2021    Influenza Vaccine Split 10/24/2011    Influenza Vaccine Whole 2006    TB Skin Test (PPD) Intradermal 10/08/2020     Current Outpatient Medications   Medication Sig    metFORMIN ER (GLUCOPHAGE XR) 500 mg tablet Take 1 Tablet by mouth daily (with dinner).  Blood-Glucose Meter (Blood Glucose Monitoring) monitoring kit Use daily as directed    lancets misc Use daily as directed    glucose blood VI test strips (blood glucose test) strip Use daily as directed    ergocalciferol (ERGOCALCIFEROL) 1,250 mcg (50,000 unit) capsule TAKE 1 CAPSULE BY MOUTH ONCE A WEEK    amLODIPine (NORVASC) 5 mg tablet TAKE 1 TABLET BY MOUTH EVERY DAY    atorvastatin (LIPITOR) 10 mg tablet TAKE 1 TABLET BY MOUTH EVERY DAY    aspirin 81 mg tablet Take 81 mg by mouth. No current facility-administered medications for this visit. Allergies   Allergen Reactions    Omnicef [Cefdinir] Diarrhea     REVIEW OF SYSTEMS: colo Dr Marisa Rojas 3/21  Ophtho  no vision change or eye pain  Oral  no mouth pain, tongue or tooth problems  Ears  no hearing loss, ear pain, fullness, no swallowing problems  Cardiac  no CP, PND, orthopnea, edema, palpitations or syncope  Chest  no breast masses  Resp  no wheezing, chronic coughing, dyspnea  GI  no heartburn, nausea, vomiting, change in bowel habits, bleeding, hemorrhoids  Urinary  no dysuria, hematuria, flank pain, urgency, frequency    Visit Vitals  /75   Pulse 81   Temp 97.3 °F (36.3 °C) (Temporal)   Resp 16   Ht 5' 11\" (1.803 m)   Wt 239 lb (108.4 kg)   SpO2 96%   BMI 33.33 kg/m²   A&O x3  Affect is appropriate. Mood stable  No apparent distress  Anicteric, no JVD, adenopathy or thyromegaly. No carotid bruits or radiated murmur  Lungs clear to auscultation, no wheezes or rales  Heart showed regular rate and rhythm. No murmur, rubs, gallops  Abdomen soft nontender, no hepatosplenomegaly or masses. Extremities without edema.   Pulses 1-2+ symmetrically  Foot exam bilaterally showed  Reflexes 2+   Vibration, proprioception, filament test intact  Pulses 1-2+ DP and PT  No deformities noted  No onychomycosis    LABS  From 9/06 showed   gluc 98,   cr 1.10,      alt 63,       chol 124, tg 323, hdl 28, ldl-c 31, wbc 5.7, hb 15.7, plt 258, tsh 1.86, ua neg,    ast 40  From 11/09 showed gluc 96,   cr 1.20, gfr>60,  alt 66,       chol 125, tg 122, hdl 33, ldl-c 68  From 10/11 showed gluc 103, cr 1.14, gfr 79,   alt 42,          ldl-p 1106,                       tsh 1.80  From 3/12 showed   gluc 102, cr 0.87, gfr 106, alt 43, hba1c 5.7, ldl-p 528,   chol 102, tg 170, hdl 36, ldl-c 32  From 1/14 showed   gluc 99,   cr 0.93, gfr>60,                 wbc 9.5, hb 14.7, plt 326  From 2/15 showed   gluc 111, cr 0.84, gfr 105, alt 34, hba1c 5.7,      chol 108, tg 202, hdl 34, ldl-c 34, wbc 6.3, hb 15.3, plt 287,       ua neg  From 2/15 showed                   tsh 1.50, vit d 18.2, uric 5.0, hep b/c neg, ra neg, jeane neg, ccp neg, esr 2  From 10/15 showed         hba1c 5.6,      chol 92,   tg 141, hdl 31, ldl-c 33  From 2/16 showed   gluc 98,   cr 0.82, gfr>60,  alt 40,       chol 96,   tg 239, hdl 34, ldl-c 48, wbc 8.3, hb 15.5, plt 258, tsh 1.11, ua neg  From 3/16 showed                              ra neg, jeane neg, ccp neg, esr 2, crp 0.3  From 11/16 showed gluc 149, cr 1.30, gfr 59,   alt 51,                wbc 8.5, hb 15.1, plt 266,             lip 175  From 2/17 showed   gluc 114, cr 0.90, gfr 104,     hba1c 5.7,      chol 98,   tg 174, hdl 29, ldl-c 34,                  tsh 1.53, vit d 34.2  From 1/19 showed   gluc 109, cr 0.94, gfr>60,  alt 43, hba1c 5.5,     chol 90,   tg 141, hdl 33, ldl-c 27, wbc 6.9, hb 16.1, plt 230,       vit d 28.4, psa 0.50  From 4/20 showed   gluc 182, cr 1.15, gfr>60,                 wb 13.7, hb 16.2, plt 245    Results for orders placed or performed during the hospital encounter of 10/07/21   HEMOGLOBIN A1C W/O EAG   Result Value Ref Range    Hemoglobin A1c 5.8 (H) 4.2 - 5.6 %   LIPID PANEL   Result Value Ref Range    LIPID PROFILE          Cholesterol, total 105 <200 MG/DL    Triglyceride 237 (H) <150 MG/DL    HDL Cholesterol 36 (L) 40 - 60 MG/DL    LDL, calculated 21.6 0 - 100 MG/DL VLDL, calculated 47.4 MG/DL    CHOL/HDL Ratio 2.9 0 - 5.0     METABOLIC PANEL, COMPREHENSIVE   Result Value Ref Range    Sodium 140 136 - 145 mmol/L    Potassium 4.3 3.5 - 5.5 mmol/L    Chloride 107 100 - 111 mmol/L    CO2 27 21 - 32 mmol/L    Anion gap 6 3.0 - 18 mmol/L    Glucose 130 (H) 74 - 99 mg/dL    BUN 12 7.0 - 18 MG/DL    Creatinine 0.95 0.6 - 1.3 MG/DL    BUN/Creatinine ratio 13 12 - 20      GFR est AA >60 >60 ml/min/1.73m2    GFR est non-AA >60 >60 ml/min/1.73m2    Calcium 9.2 8.5 - 10.1 MG/DL    Bilirubin, total 0.6 0.2 - 1.0 MG/DL    ALT (SGPT) 50 16 - 61 U/L    AST (SGOT) 31 10 - 38 U/L    Alk. phosphatase 69 45 - 117 U/L    Protein, total 7.6 6.4 - 8.2 g/dL    Albumin 4.2 3.4 - 5.0 g/dL    Globulin 3.4 2.0 - 4.0 g/dL    A-G Ratio 1.2 0.8 - 1.7     CBC WITH AUTOMATED DIFF   Result Value Ref Range    WBC 6.1 4.6 - 13.2 K/uL    RBC 5.33 4.35 - 5.65 M/uL    HGB 15.6 13.0 - 16.0 g/dL    HCT 46.4 36.0 - 48.0 %    MCV 87.1 78.0 - 100.0 FL    MCH 29.3 24.0 - 34.0 PG    MCHC 33.6 31.0 - 37.0 g/dL    RDW 12.0 11.6 - 14.5 %    PLATELET 428 514 - 832 K/uL    MPV 11.8 9.2 - 11.8 FL    NEUTROPHILS 58 40 - 73 %    LYMPHOCYTES 31 21 - 52 %    MONOCYTES 10 3 - 10 %    EOSINOPHILS 1 0 - 5 %    BASOPHILS 1 0 - 2 %    ABS. NEUTROPHILS 3.5 1.8 - 8.0 K/UL    ABS. LYMPHOCYTES 1.9 0.9 - 3.6 K/UL    ABS. MONOCYTES 0.6 0.05 - 1.2 K/UL    ABS. EOSINOPHILS 0.1 0.0 - 0.4 K/UL    ABS. BASOPHILS 0.0 0.0 - 0.1 K/UL    DF AUTOMATED       We reviewed the patient's labs from the last several visits to point out trends in the numbers          Patient Active Problem List   Diagnosis Code    Dyslipidemia E78.5    Hypovitaminosis D E55.9    Obesity (BMI 30.0-34. 9) E66.9    Primary hypertension I10    Nephrolithiasis Dr Juany Juárez N20.0    Colon adenoma D12.6    Controlled type 2 diabetes mellitus without complication, without long-term current use of insulin (HCC) E11.9     Assessment and plan:  1. Hypovit D.  supplementation  2. Nephrolithiasis. Hydration and f/u urology  3. HTN. Controlled on lisinopril and amlodipine  4. Obesity. Lifestyle and dietary measures, portion control    NEW ISSUES  5. Colon adenoma. Fiber, colo 2026  6. Late preDM or early DM with fbs meeting criteria x1. Another long discussion. He is interested in being aggressive and going for diabetic education along with starting Metformin and following his sugars. Side effects discussed, will send to the educator, prescription for monitor sent in and schedule discussed. Follow-up in 4 months. He sees ophthalmology yearly. 7.  Dyslipidemia. Continue lipitor. Tighten up diet and increase exercise as below      RTC 2/22    Above conditions discussed at length and patient vocalized understanding. All questions answered to patient satisfaction          ICD-10-CM ICD-9-CM    1. Physical exam  Z00.00 V70.9    2. Controlled type 2 diabetes mellitus without complication, without long-term current use of insulin (HCC)  E11.9 250.00 metFORMIN ER (GLUCOPHAGE XR) 500 mg tablet       DIABETES EDUCATION      Blood-Glucose Meter (Blood Glucose Monitoring) monitoring kit      lancets misc      glucose blood VI test strips (blood glucose test) strip       DIABETES FOOT EXAM      METABOLIC PANEL, BASIC      LIPID PANEL      MICROALBUMIN, UR, RAND W/ MICROALB/CREAT RATIO      HEMOGLOBIN A1C WITH EAG   3. Dyslipidemia  E78.5 272.4    4. Obesity (BMI 30.0-34. 9)  E66.9 278.00    5. Nephrolithiasis Dr Amish Taylor  N20.0 592.0    6. Colon adenoma  D12.6 211.3    7.  Primary hypertension  I10 401.9

## 2021-10-13 ENCOUNTER — OFFICE VISIT (OUTPATIENT)
Dept: INTERNAL MEDICINE CLINIC | Age: 51
End: 2021-10-13
Payer: COMMERCIAL

## 2021-10-13 VITALS
WEIGHT: 239 LBS | DIASTOLIC BLOOD PRESSURE: 75 MMHG | HEART RATE: 81 BPM | HEIGHT: 71 IN | TEMPERATURE: 97.3 F | RESPIRATION RATE: 16 BRPM | BODY MASS INDEX: 33.46 KG/M2 | SYSTOLIC BLOOD PRESSURE: 133 MMHG | OXYGEN SATURATION: 96 %

## 2021-10-13 DIAGNOSIS — D12.6 COLON ADENOMA: ICD-10-CM

## 2021-10-13 DIAGNOSIS — N20.0 NEPHROLITHIASIS: ICD-10-CM

## 2021-10-13 DIAGNOSIS — E66.9 OBESITY (BMI 30.0-34.9): ICD-10-CM

## 2021-10-13 DIAGNOSIS — I10 PRIMARY HYPERTENSION: ICD-10-CM

## 2021-10-13 DIAGNOSIS — Z00.00 PHYSICAL EXAM: Primary | ICD-10-CM

## 2021-10-13 DIAGNOSIS — E78.5 DYSLIPIDEMIA: ICD-10-CM

## 2021-10-13 DIAGNOSIS — E11.9 CONTROLLED TYPE 2 DIABETES MELLITUS WITHOUT COMPLICATION, WITHOUT LONG-TERM CURRENT USE OF INSULIN (HCC): ICD-10-CM

## 2021-10-13 PROCEDURE — 99396 PREV VISIT EST AGE 40-64: CPT | Performed by: INTERNAL MEDICINE

## 2021-10-13 RX ORDER — METFORMIN HYDROCHLORIDE 500 MG/1
500 TABLET, EXTENDED RELEASE ORAL
Qty: 30 TABLET | Refills: 5 | Status: SHIPPED | OUTPATIENT
Start: 2021-10-13 | End: 2022-03-15 | Stop reason: DRUGHIGH

## 2021-10-13 RX ORDER — IBUPROFEN 200 MG
CAPSULE ORAL
Qty: 100 STRIP | Refills: 11 | Status: SHIPPED | OUTPATIENT
Start: 2021-10-13

## 2021-10-13 RX ORDER — LANCETS
EACH MISCELLANEOUS
Qty: 1 EACH | Refills: 11 | Status: SHIPPED | OUTPATIENT
Start: 2021-10-13

## 2021-10-13 RX ORDER — INSULIN PUMP SYRINGE, 3 ML
EACH MISCELLANEOUS
Qty: 1 KIT | Refills: 0 | Status: SHIPPED | OUTPATIENT
Start: 2021-10-13

## 2021-10-13 NOTE — PROGRESS NOTES
Selene Jewell presents today for   Chief Complaint   Patient presents with    Physical    Hypertension    Cholesterol Problem    Labs     10-7-21         Coordination of Care:  1. Have you been to the ER, urgent care clinic since your last visit? Hospitalized since your last visit? YES    2. Have you seen or consulted any other health care providers outside of the [address] [place] [address] since your last visit? Include any pap smears or colon screening.  YES

## 2021-11-28 DIAGNOSIS — E78.5 DYSLIPIDEMIA: ICD-10-CM

## 2021-11-29 RX ORDER — ATORVASTATIN CALCIUM 10 MG/1
TABLET, FILM COATED ORAL
Qty: 90 TABLET | Refills: 3 | Status: SHIPPED | OUTPATIENT
Start: 2021-11-29

## 2022-03-02 NOTE — PROGRESS NOTES
46 y.o. WHITE/NON- male who presents for evaluation    We called diabetes at the last visit. 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 He was guesstimating consuming about 3500 kcal/day, he is cut that down to 1700 kcal.  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 Nevertheless, weight is down about 6 pounds. Restarted Metformin also, no side effects reported. He is reliably getting between 115-130 fasting. Denies polyuria, polydipsia, nocturia, vision change. Vitals 3/15/2022 10/13/2021 10/13/2021 8/26/2021 1/6/2021   Weight 233 lb  239 lb 230 lb 230 lb     Blood pressure remains controlled on current regimen.   Not able to exercise because of his work schedule    No gi or  complaints    Past Medical History:   Diagnosis Date    Cellulitis 1/14    Dr Aiden James left leg    Colon adenoma 03/01/2021    Dr Kwabena Gonzales and hyperplastic    Dyslipidemia     Hypertension     IFG (impaired fasting glucose) 10/2011    2 hr gtt 160    Inguinal hernia     Nephrolithiasis     Dr Jaylon Anand 2008; 11/16; 7/18; ca oxalate in past; 4/20 w RIGHT hydronephrosis s/p laser litho and URS Dr Estephania Bertrand    Obesity     IF 1/19 start weight 224 lbs    Proptosis     RIGHT side, negative thyroid eval    Psoriasis     Umbilical hernia 09/7013     Past Surgical History:   Procedure Laterality Date    HX APPENDECTOMY      HX COLONOSCOPY      Dr Kwabena Gonzales 3/1/21 hyperplastic and adenoma    HX KNEE ARTHROSCOPY Left 12/2013    Dr Tonja Sanchez TYMPANOSTOMY       Social History     Socioeconomic History    Marital status:      Spouse name: Not on file    Number of children: Not on file    Years of education: Not on file    Highest education level: Not on file   Occupational History    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[employer]; part owner [employer]   Tobacco Use    Smoking status: Former Smoker     Packs/day: 0.25     Years: 15.00     Pack years: 3.75     Quit date: 2003     Years since quittin.2    Smokeless tobacco: Never Used   Substance and Sexual Activity    Alcohol use: Yes     Alcohol/week: 2.5 standard drinks     Types: 3 Glasses of wine per week     Comment: 2-3x a week     Drug use: No    Sexual activity: Not on file   Other Topics Concern    Not on file   Social History Narrative    Not on file     Social Determinants of Health     Financial Resource Strain:     Difficulty of Paying Living Expenses: Not on file   Food Insecurity:     Worried About Running Out of Food in the Last Year: Not on file    Lorelei of Food in the Last Year: Not on file   Transportation Needs:     Lack of Transportation (Medical): Not on file    Lack of Transportation (Non-Medical):  Not on file   Physical Activity:     Days of Exercise per Week: Not on file    Minutes of Exercise per Session: Not on file   Stress:     Feeling of Stress : Not on file   Social Connections:     Frequency of Communication with Friends and Family: Not on file    Frequency of Social Gatherings with Friends and Family: Not on file    Attends Islam Services: Not on file    Active Member of [address] [place] [address] [employer] or Organizations: Not on file    Attends Club or Organization Meetings: Not on file    Marital Status: Not on file   Intimate Partner Violence:     Fear of Current or Ex-Partner: Not on file    Emotionally Abused: Not on file    Physically Abused: Not on file    Sexually Abused: Not on file   Housing Stability:     Unable to Pay for Housing in the Last Year: Not on file    Number of [place] in the Last Year: Not on file    Unstable Housing in the Last Year: Not on file     Family History   Problem Relation Age of Onset    Hypertension Mother     Diabetes Mother     Cancer Father         bladder    Colon Cancer Paternal Grandmother      Immunization History   Administered Date(s) Administered   [facility] COVID-19, Pfizer Purple top, DILUTE for use, 12+ yrs, 30mcg/0.3mL dose 01/19/2021, 02/11/2021    Influenza Vaccine 09/20/2016, 11/01/2016, 11/01/2017, 11/01/2018    Influenza Vaccine (Quad) PF (>6 Mo Flulaval, Fluarix, and >3 Yrs Afluria, Fluzone 51540) 10/04/2017, 09/21/2021    Influenza Vaccine Split 10/24/2011    Influenza Vaccine Whole 12/04/2006    TB Skin Test (PPD) Intradermal 10/08/2020     Current Outpatient Medications   Medication Sig    metFORMIN ER (GLUCOPHAGE XR) 500 mg tablet Take 2 Tablets by mouth daily (with dinner).  amLODIPine (NORVASC) 5 mg tablet TAKE 1 TABLET BY MOUTH EVERY DAY    lisinopriL (PRINIVIL, ZESTRIL) 20 mg tablet TAKE 1 TABLET BY MOUTH EVERY DAY    atorvastatin (LIPITOR) 10 mg tablet TAKE 1 TABLET BY MOUTH EVERY DAY    Blood-Glucose Meter (Blood Glucose Monitoring) monitoring kit Use daily as directed    lancets misc Use daily as directed    glucose blood VI test strips (blood glucose test) strip Use daily as directed    ergocalciferol (ERGOCALCIFEROL) 1,250 mcg (50,000 unit) capsule TAKE 1 CAPSULE BY MOUTH ONCE A WEEK    aspirin 81 mg tablet Take 81 mg by mouth. No current facility-administered medications for this visit. Allergies   Allergen Reactions    Omnicef [Cefdinir] Diarrhea     REVIEW OF SYSTEMS: colo Dr Pro Herndon 3/21  Ophtho - no vision change or eye pain  Oral - no mouth pain, tongue or tooth problems  Ears - no hearing loss, ear pain, fullness, no swallowing problems  Cardiac - no CP, PND, orthopnea, edema, palpitations or syncope  Chest - no breast masses  Resp - no wheezing, chronic coughing, dyspnea  GI - no heartburn, nausea, vomiting, change in bowel habits, bleeding, hemorrhoids  Urinary - no dysuria, hematuria, flank pain, urgency, frequency    Visit Vitals  /70   Pulse 60   Temp 97.9 °F (36.6 °C) (Temporal)   Resp 16   Ht 5' 11\" (1.803 m)   Wt 233 lb (105.7 kg)   SpO2 97%   BMI 32.50 kg/m²   A&O x3  Affect is appropriate.   Mood stable  No apparent distress  Anicteric, no JVD, adenopathy or thyromegaly. No carotid bruits or radiated murmur  Lungs clear to auscultation, no wheezes or rales  Heart showed regular rate and rhythm. No murmur, rubs, gallops  Abdomen soft nontender, no hepatosplenomegaly or masses. Extremities without edema.   Pulses 1-2+ symmetrically    LABS  From 9/06 showed   gluc 98,   cr 1.10,      alt 63,       chol 124, tg 323, hdl 28, ldl-c 31, wbc 5.7, hb 15.7, plt 258, tsh 1.86, ua neg,    ast 40  From 11/09 showed gluc 96,   cr 1.20, gfr>60,  alt 66,       chol 125, tg 122, hdl 33, ldl-c 68  From 10/11 showed gluc 103, cr 1.14, gfr 79,   alt 42,          ldl-p 1106,                       tsh 1.80  From 3/12 showed   gluc 102, cr 0.87, gfr 106, alt 43, hba1c 5.7, ldl-p 528,   chol 102, tg 170, hdl 36, ldl-c 32  From 1/14 showed   gluc 99,   cr 0.93, gfr>60,                 wbc 9.5, hb 14.7, plt 326  From 2/15 showed   gluc 111, cr 0.84, gfr 105, alt 34, hba1c 5.7,      chol 108, tg 202, hdl 34, ldl-c 34, wbc 6.3, hb 15.3, plt 287,       ua neg  From 2/15 showed                   tsh 1.50, vit d 18.2, uric 5.0, hep b/c neg, ra neg, jeane neg, ccp neg, esr 2  From 10/15 showed         hba1c 5.6,      chol 92,   tg 141, hdl 31, ldl-c 33  From 2/16 showed   gluc 98,   cr 0.82, gfr>60,  alt 40,       chol 96,   tg 239, hdl 34, ldl-c 48, wbc 8.3, hb 15.5, plt 258, tsh 1.11, ua neg  From 3/16 showed                              ra neg, jeane neg, ccp neg, esr 2, crp 0.3  From 11/16 showed gluc 149, cr 1.30, gfr 59,   alt 51,                wbc 8.5, hb 15.1, plt 266,             lip 175  From 2/17 showed   gluc 114, cr 0.90, gfr 104,     hba1c 5.7,      chol 98,   tg 174, hdl 29, ldl-c 34,                  tsh 1.53, vit d 34.2  From 1/19 showed   gluc 109, cr 0.94, gfr>60,  alt 43, hba1c 5.5,     chol 90,   tg 141, hdl 33, ldl-c 27, wbc 6.9, hb 16.1, plt 230,       vit d 28.4, psa 0.50  From 4/20 showed   gluc 182, cr 1.15, gfr>60,                 wb 13.7, hb 16.2, plt 245  From 10/21 showed gluc 130, cr 0.95, gfr>60,  alt 50, hba1c 5.8,      chol 105, tg 237, hdl 36, ldl-c 22, wbc 6.1, hb 15.6, plt 256    Results for orders placed or performed during the hospital encounter of 41/17/19   METABOLIC PANEL, BASIC   Result Value Ref Range    Sodium 138 136 - 145 mmol/L    Potassium 4.2 3.5 - 5.5 mmol/L    Chloride 107 100 - 111 mmol/L    CO2 27 21 - 32 mmol/L    Anion gap 4 3.0 - 18 mmol/L    Glucose 119 (H) 74 - 99 mg/dL    BUN 14 7.0 - 18 MG/DL    Creatinine 0.94 0.6 - 1.3 MG/DL    BUN/Creatinine ratio 15 12 - 20      GFR est AA >60 >60 ml/min/1.73m2    GFR est non-AA >60 >60 ml/min/1.73m2    Calcium 8.7 8.5 - 10.1 MG/DL   LIPID PANEL   Result Value Ref Range    LIPID PROFILE          Cholesterol, total 88 <200 MG/DL    Triglyceride 158 (H) <150 MG/DL    HDL Cholesterol 33 (L) 40 - 60 MG/DL    LDL, calculated 23.4 0 - 100 MG/DL    VLDL, calculated 31.6 MG/DL    CHOL/HDL Ratio 2.7 0 - 5.0     MICROALBUMIN, UR, RAND W/ MICROALB/CREAT RATIO   Result Value Ref Range    Microalbumin,urine random 1.00 0 - 3.0 MG/DL    Creatinine, urine 224.00 (H) 30 - 125 mg/dL    Microalbumin/Creat ratio (mg/g creat) 4 0 - 30 mg/g   HEMOGLOBIN A1C WITH EAG   Result Value Ref Range    Hemoglobin A1c 5.8 (H) 4.2 - 5.6 %    Est. average glucose 120 mg/dL     We reviewed the patient's labs from the last several visits to point out trends in the numbers          Patient Active Problem List   Diagnosis Code    Dyslipidemia E78.5    Hypovitaminosis D E55.9    Obesity (BMI 30.0-34. 9) E66.9    Primary hypertension I10    Nephrolithiasis Dr Ko White N20.0    Colon adenoma D12.6    Controlled type 2 diabetes mellitus without complication, without long-term current use of insulin (HCC) E11.9     Assessment and plan:  1. Hypovit D.  supplementation  2. Nephrolithiasis. Hydration and f/u urology  3. HTN. Controlled on lisinopril and amlodipine  4. Obesity. Lifestyle and dietary measures, portion control, IF;  congratulated him on the wt loss thus far  5. Colon adenoma. Fiber, colo 2026  6. DM. Inc metformin to 1000, no hypo episodes so far and he knows what to watch for  7. Dyslipidemia. Continue lipitor. RTC 9/22    Above conditions discussed at length and patient vocalized understanding. All questions answered to patient satisfaction          ICD-10-CM ICD-9-CM    1. Controlled type 2 diabetes mellitus without complication, without long-term current use of insulin (HCC)  E11.9 250.00 metFORMIN ER (GLUCOPHAGE XR) 500 mg tablet      METABOLIC PANEL, COMPREHENSIVE      CBC W/O DIFF      HEMOGLOBIN A1C WITH EAG   2. Primary hypertension  I10 401.9    3. Dyslipidemia  E78.5 272.4    4. Obesity (BMI 30.0-34. 9)  E66.9 278.00

## 2022-03-04 DIAGNOSIS — I10 PRIMARY HYPERTENSION: ICD-10-CM

## 2022-03-06 RX ORDER — AMLODIPINE BESYLATE 5 MG/1
TABLET ORAL
Qty: 90 TABLET | Refills: 3 | Status: SHIPPED | OUTPATIENT
Start: 2022-03-06

## 2022-03-11 ENCOUNTER — HOSPITAL ENCOUNTER (OUTPATIENT)
Dept: LAB | Age: 52
Discharge: HOME | End: 2022-03-11
Payer: COMMERCIAL

## 2022-03-11 DIAGNOSIS — E11.9 CONTROLLED TYPE 2 DIABETES MELLITUS WITHOUT COMPLICATION, WITHOUT LONG-TERM CURRENT USE OF INSULIN (HCC): ICD-10-CM

## 2022-03-11 LAB
ANION GAP SERPL CALC-SCNC: 4 MMOL/L (ref 3–18)
BUN SERPL-MCNC: 14 MG/DL (ref 7–18)
BUN/CREAT SERPL: 15 (ref 12–20)
CALCIUM SERPL-MCNC: 8.7 MG/DL (ref 8.5–10.1)
CHLORIDE SERPL-SCNC: 107 MMOL/L (ref 100–111)
CHOLEST SERPL-MCNC: 88 MG/DL
CO2 SERPL-SCNC: 27 MMOL/L (ref 21–32)
CREAT SERPL-MCNC: 0.94 MG/DL (ref 0.6–1.3)
CREAT UR-MCNC: 224 MG/DL (ref 30–125)
EST. AVERAGE GLUCOSE BLD GHB EST-MCNC: 120 MG/DL
GLUCOSE SERPL-MCNC: 119 MG/DL (ref 74–99)
HBA1C MFR BLD: 5.8 % (ref 4.2–5.6)
HDLC SERPL-MCNC: 33 MG/DL (ref 40–60)
HDLC SERPL: 2.7 {RATIO} (ref 0–5)
LDLC SERPL CALC-MCNC: 23.4 MG/DL (ref 0–100)
LIPID PROFILE,FLP: ABNORMAL
MICROALBUMIN UR-MCNC: 1 MG/DL (ref 0–3)
MICROALBUMIN/CREAT UR-RTO: 4 MG/G (ref 0–30)
POTASSIUM SERPL-SCNC: 4.2 MMOL/L (ref 3.5–5.5)
SODIUM SERPL-SCNC: 138 MMOL/L (ref 136–145)
TRIGL SERPL-MCNC: 158 MG/DL (ref ?–150)
VLDLC SERPL CALC-MCNC: 31.6 MG/DL

## 2022-03-11 PROCEDURE — 80048 BASIC METABOLIC PNL TOTAL CA: CPT

## 2022-03-11 PROCEDURE — 36415 COLL VENOUS BLD VENIPUNCTURE: CPT

## 2022-03-11 PROCEDURE — 80061 LIPID PANEL: CPT

## 2022-03-11 PROCEDURE — 82043 UR ALBUMIN QUANTITATIVE: CPT

## 2022-03-11 PROCEDURE — 83036 HEMOGLOBIN GLYCOSYLATED A1C: CPT

## 2022-03-15 ENCOUNTER — OFFICE VISIT (OUTPATIENT)
Dept: INTERNAL MEDICINE CLINIC | Age: 52
End: 2022-03-15
Payer: COMMERCIAL

## 2022-03-15 ENCOUNTER — TELEPHONE (OUTPATIENT)
Dept: INTERNAL MEDICINE CLINIC | Age: 52
End: 2022-03-15

## 2022-03-15 VITALS
HEIGHT: 71 IN | RESPIRATION RATE: 16 BRPM | DIASTOLIC BLOOD PRESSURE: 70 MMHG | TEMPERATURE: 97.9 F | BODY MASS INDEX: 32.62 KG/M2 | HEART RATE: 60 BPM | SYSTOLIC BLOOD PRESSURE: 126 MMHG | OXYGEN SATURATION: 97 % | WEIGHT: 233 LBS

## 2022-03-15 DIAGNOSIS — I10 PRIMARY HYPERTENSION: ICD-10-CM

## 2022-03-15 DIAGNOSIS — E78.5 DYSLIPIDEMIA: ICD-10-CM

## 2022-03-15 DIAGNOSIS — E11.9 CONTROLLED TYPE 2 DIABETES MELLITUS WITHOUT COMPLICATION, WITHOUT LONG-TERM CURRENT USE OF INSULIN (HCC): Primary | ICD-10-CM

## 2022-03-15 DIAGNOSIS — E66.9 OBESITY (BMI 30.0-34.9): ICD-10-CM

## 2022-03-15 PROCEDURE — 99214 OFFICE O/P EST MOD 30 MIN: CPT | Performed by: INTERNAL MEDICINE

## 2022-03-15 RX ORDER — METFORMIN HYDROCHLORIDE 500 MG/1
1000 TABLET, EXTENDED RELEASE ORAL
Qty: 180 TABLET | Refills: 3 | Status: SHIPPED | OUTPATIENT
Start: 2022-03-15

## 2022-03-15 NOTE — PROGRESS NOTES
Federica Trinidad presents with   Chief Complaint   Patient presents with    Follow-up     4 month    Hypertension    Cholesterol Problem    Diabetes    Labs     3-11-22            1. \"Have you been to the ER, urgent care clinic since your last visit? Hospitalized since your last visit? \" NO    2. \"Have you seen or consulted any other health care providers outside of the [address] [place] [address] since your last visit? \" NO    3. For patients aged 39-70: Has the patient had a colonoscopy? Yes - no Care Gap present     If the patient is female:    4. For patients aged 41-77: Has the patient had a mammogram within the past 2 years? NA - based on age    11. For patients aged 21-65: Has the patient had a pap smear?  NA - based on age

## 2022-03-19 PROBLEM — E11.9 CONTROLLED TYPE 2 DIABETES MELLITUS WITHOUT COMPLICATION, WITHOUT LONG-TERM CURRENT USE OF INSULIN (HCC): Status: ACTIVE | Noted: 2021-10-13

## 2022-03-19 PROBLEM — D12.6 COLON ADENOMA: Status: ACTIVE | Noted: 2021-03-21

## 2022-03-19 PROBLEM — E66.9 OBESITY (BMI 30.0-34.9): Status: ACTIVE | Noted: 2017-02-15

## 2022-03-19 PROBLEM — N20.0 NEPHROLITHIASIS: Status: ACTIVE | Noted: 2017-02-15

## 2022-03-19 PROBLEM — E66.811 OBESITY (BMI 30.0-34.9): Status: ACTIVE | Noted: 2017-02-15

## 2022-03-20 PROBLEM — I10 PRIMARY HYPERTENSION: Status: ACTIVE | Noted: 2017-02-15

## 2022-06-12 DIAGNOSIS — E55.9 HYPOVITAMINOSIS D: ICD-10-CM

## 2022-06-12 DIAGNOSIS — M25.50 ARTHRALGIA: ICD-10-CM

## 2022-06-13 NOTE — TELEPHONE ENCOUNTER
Last Visit: 3/15/22 with MD Ashok Sheppard  Next Appointment: 9/15/22 with MD Ashok Sheppard  Previous Refill Encounter(s): 2/10/21 #12 with 3 refills    Requested Prescriptions     Pending Prescriptions Disp Refills    ergocalciferol (ERGOCALCIFEROL) 1,250 mcg (50,000 unit) capsule 12 Capsule 3     Sig: TAKE 1 CAPSULE BY MOUTH ONCE A WEEK         For Pharmacy Admin Tracking Only     CPA in place:    Recommendation Provided To:    Intervention Detail: New Rx: 1, reason: Patient Preference   Gap Closed?:    Intervention Accepted By:   [facility] Time Spent (min): 5

## 2022-06-14 RX ORDER — ERGOCALCIFEROL 1.25 MG/1
CAPSULE ORAL
Qty: 12 CAPSULE | Refills: 3 | Status: CANCELLED | OUTPATIENT
Start: 2022-06-14

## 2022-06-14 NOTE — TELEPHONE ENCOUNTER
pls call pt  Hold vit d for now  Has been on high dose for a while and level not checked - will check at next visit

## 2022-09-15 DIAGNOSIS — E55.9 HYPOVITAMINOSIS D: ICD-10-CM

## 2022-10-12 DIAGNOSIS — I10 PRIMARY HYPERTENSION: ICD-10-CM

## 2022-10-12 RX ORDER — LISINOPRIL 20 MG/1
TABLET ORAL
Qty: 90 TABLET | Refills: 2 | Status: SHIPPED | OUTPATIENT
Start: 2022-10-12

## 2022-10-25 ENCOUNTER — TELEPHONE (OUTPATIENT)
Dept: INTERNAL MEDICINE CLINIC | Age: 52
End: 2022-10-25

## 2022-11-27 NOTE — PROGRESS NOTES
46 y.o. WHITE/NON- male who presents for evaluation    Denies polyuria, polydipsia, nocturia, vision change. Getting <120 when he checks about 3x/week. He was doing IF but stopped it, weight is up as below    Vitals 2022 3/15/2022 10/13/2021 10/13/2021 2021   Weight 235 lb 233 lb  239 lb 230 lb     Blood pressure has been running high as well, getting 150s over 100s with associated sx of feeling unwell, flushing, etc    No gi or  complaints    Past Medical History:   Diagnosis Date    Cellulitis     Dr Lemus Record left leg    Colon adenoma 2021    Dr Hanna Madden and hyperplastic    Dyslipidemia     Hypertension     IFG (impaired fasting glucose) 10/2011    2 hr gtt 160    Inguinal hernia     Nephrolithiasis     Dr Juana Malcolm ; ; ; ca oxalate in past;  w RIGHT hydronephrosis s/p laser litho and URS Dr Suraj Quick    Obesity     IF  start weight 224 lbs    Proptosis     RIGHT side, negative thyroid eval    Psoriasis     Umbilical hernia 8218     Past Surgical History:   Procedure Laterality Date    HX APPENDECTOMY      HX COLONOSCOPY      Dr Hanna Madden 3/1/21 hyperplastic and adenoma    HX KNEE ARTHROSCOPY Left 2013    Dr Mcgraw Postin TYMPANOSTOMY       Social History     Socioeconomic History    Marital status:      Spouse name: Not on file    Number of children: Not on file    Years of education: Not on file    Highest education level: Not on file   Occupational History    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[employer]; 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[employer]   Tobacco Use    Smoking status: Former     Packs/day: 0.25     Years: 15.00     Pack years: 3.75     Types: Cigarettes     Quit date: 2003     Years since quittin.9    Smokeless tobacco: Never   Substance and Sexual Activity    Alcohol use:  Yes     Alcohol/week: 2.5 standard drinks     Types: 3 Glasses of wine per week     Comment: 2-3x a week     Drug use: No    Sexual activity: Not on file   Other Topics Concern    Not on file   Social History Narrative    Not on file     Social Determinants of Health     Financial Resource Strain: Not on file   Food Insecurity: Not on file   Transportation Needs: Not on file   Physical Activity: Not on file   Stress: Not on file   Social Connections: Not on file   Intimate Partner Violence: Not on file   Housing Stability: Not on file     Family History   Problem Relation Age of Onset    Hypertension Mother     Diabetes Mother     Cancer Father         bladder    Colon Cancer Paternal Grandmother      Immunization History   Administered Date(s) Administered    COVID-19, PFIZER PURPLE top, DILUTE for use, (age 15 y+), IM, 30mcg/0.3mL 01/19/2021, 02/11/2021    Influenza Vaccine 09/20/2016, 11/01/2016, 11/01/2017, 11/01/2018    Influenza Vaccine Split 10/24/2011    Influenza Vaccine Whole 12/04/2006    Influenza, FLUARIX, FLULAVAL, FLUZONE (age 10 mo+) AND AFLURIA, (age 1 y+), PF, 0.5mL 10/04/2017, 09/21/2021    TB Skin Test (PPD) Intradermal 10/08/2020     Current Outpatient Medications   Medication Sig    lisinopriL (PRINIVIL, ZESTRIL) 40 mg tablet Take 1 Tablet by mouth daily. semaglutide (OZEMPIC) 0.25 mg or 0.5 mg/dose (2 mg/1.5 ml) subq pen 0.25 mg by SubCUTAneous route every seven (7) days. metFORMIN ER (GLUCOPHAGE XR) 500 mg tablet Take 2 Tablets by mouth daily (with dinner). amLODIPine (NORVASC) 5 mg tablet TAKE 1 TABLET BY MOUTH EVERY DAY    atorvastatin (LIPITOR) 10 mg tablet TAKE 1 TABLET BY MOUTH EVERY DAY    Blood-Glucose Meter (Blood Glucose Monitoring) monitoring kit Use daily as directed    lancets misc Use daily as directed    glucose blood VI test strips (blood glucose test) strip Use daily as directed    ergocalciferol (ERGOCALCIFEROL) 1,250 mcg (50,000 unit) capsule TAKE 1 CAPSULE BY MOUTH ONCE A WEEK    aspirin 81 mg tablet Take 81 mg by mouth. No current facility-administered medications for this visit.      Allergies   Allergen Reactions    Omnicef [Cefdinir] Diarrhea     REVIEW OF SYSTEMS: precious Rai Distance 3/21  Ophtho - no vision change or eye pain  Oral - no mouth pain, tongue or tooth problems  Ears - no hearing loss, ear pain, fullness, no swallowing problems  Cardiac - no CP, PND, orthopnea, edema, palpitations or syncope  Chest - no breast masses  Resp - no wheezing, chronic coughing, dyspnea  GI - no heartburn, nausea, vomiting, change in bowel habits, bleeding, hemorrhoids  Urinary - no dysuria, hematuria, flank pain, urgency, frequency    Visit Vitals  BP (!) 151/88   Pulse 76   Temp 97.1 °F (36.2 °C) (Temporal)   Resp 18   Ht 5' 11\" (1.803 m)   Wt 235 lb (106.6 kg)   SpO2 97%   BMI 32.78 kg/m²     A&O x3  Affect is appropriate. Mood stable  No apparent distress  Anicteric, no JVD, adenopathy or thyromegaly. No carotid bruits or radiated murmur  Lungs clear to auscultation, no wheezes or rales  Heart showed regular rate and rhythm. No murmur, rubs, gallops  Abdomen soft nontender, no hepatosplenomegaly or masses. Extremities without edema.   Pulses 1-2+ symmetrically    LABS  From 9/06 showed   gluc 98,   cr 1.10,      alt 63,       chol 124, tg 323, hdl 28, ldl-c 31, wbc 5.7, hb 15.7, plt 258, tsh 1.86, ua neg,    ast 40  From 11/09 showed gluc 96,   cr 1.20, gfr>60,  alt 66,       chol 125, tg 122, hdl 33, ldl-c 68  From 10/11 showed gluc 103, cr 1.14, gfr 79,   alt 42,          ldl-p 1106,                        tsh 1.80  From 3/12 showed   gluc 102, cr 0.87, gfr 106, alt 43, hba1c 5.7, ldl-p 528,   chol 102, tg 170, hdl 36, ldl-c 32  From 1/14 showed   gluc 99,   cr 0.93, gfr>60,                  wbc 9.5, hb 14.7, plt 326  From 2/15 showed   gluc 111, cr 0.84, gfr 105, alt 34, hba1c 5.7,      chol 108, tg 202, hdl 34, ldl-c 34, wbc 6.3, hb 15.3, plt 287,        ua neg  From 2/15 showed                    tsh 1.50, vit d 18.2, uric 5.0, hep b/c neg, ra neg, jeane neg, ccp neg, esr 2  From 10/15 showed          hba1c 5.6,      chol 92,   tg 141, hdl 31, ldl-c 33  From 2/16 showed gluc 98,   cr 0.82, gfr>60,  alt 40,       chol 96,   tg 239, hdl 34, ldl-c 48, wbc 8.3, hb 15.5, plt 258, tsh 1.11, ua neg  From 3/16 showed                               ra neg, jeane neg, ccp neg, esr 2, crp 0.3  From 11/16 showed gluc 149, cr 1.30, gfr 59,   alt 51,                 wbc 8.5, hb 15.1, plt 266,             lip 175  From 2/17 showed   gluc 114, cr 0.90, gfr 104,      hba1c 5.7,      chol 98,   tg 174, hdl 29, ldl-c 34,                  tsh 1.53, vit d 34.2  From 1/19 showed   gluc 109, cr 0.94, gfr>60,  alt 43, hba1c 5.5,     chol 90,   tg 141, hdl 33, ldl-c 27, wbc 6.9, hb 16.1, plt 230,       vit d 28.4, psa 0.50  From 4/20 showed   gluc 182, cr 1.15, gfr>60,                  wb 13.7, hb 16.2, plt 245  From 10/21 showed gluc 130, cr 0.95, gfr>60,  alt 50, hba1c 5.8,      chol 105, tg 237, hdl 36, ldl-c 22, wbc 6.1, hb 15.6, plt 256  From 3/22 showed   gluc 119, cr 0.94, gfr>60,      hba1c 5.8, umar 4,    chol 88,   tg 159, hdl 33, ldl-c 23    Results for orders placed or performed during the hospital encounter of 36/44/07   METABOLIC PANEL, COMPREHENSIVE   Result Value Ref Range    Sodium 138 136 - 145 mmol/L    Potassium 4.4 3.5 - 5.5 mmol/L    Chloride 105 100 - 111 mmol/L    CO2 28 21 - 32 mmol/L    Anion gap 5 3.0 - 18 mmol/L    Glucose 124 (H) 74 - 99 mg/dL    BUN 12 7.0 - 18 MG/DL    Creatinine 0.94 0.6 - 1.3 MG/DL    BUN/Creatinine ratio 13 12 - 20      eGFR >60 >60 ml/min/1.73m2    Calcium 9.4 8.5 - 10.1 MG/DL    Bilirubin, total 0.5 0.2 - 1.0 MG/DL    ALT (SGPT) 51 16 - 61 U/L    AST (SGOT) 30 10 - 38 U/L    Alk.  phosphatase 72 45 - 117 U/L    Protein, total 7.5 6.4 - 8.2 g/dL    Albumin 4.3 3.4 - 5.0 g/dL    Globulin 3.2 2.0 - 4.0 g/dL    A-G Ratio 1.3 0.8 - 1.7     CBC W/O DIFF   Result Value Ref Range    WBC 7.3 4.6 - 13.2 K/uL    RBC 5.29 4.35 - 5.65 M/uL    HGB 15.8 13.0 - 16.0 g/dL    HCT 46.3 36.0 - 48.0 %    MCV 87.5 78.0 - 100.0 FL    MCH 29.9 24.0 - 34.0 PG    MCHC 34.1 31.0 - 37.0 g/dL    RDW 12.2 11.6 - 14.5 %    PLATELET 927 398 - 486 K/uL    MPV 11.5 9.2 - 11.8 FL    NRBC 0.0 0  WBC    ABSOLUTE NRBC 0.00 0.00 - 0.01 K/uL   HEMOGLOBIN A1C WITH EAG   Result Value Ref Range    Hemoglobin A1c 5.9 (H) 4.2 - 5.6 %    Est. average glucose 123 mg/dL   VITAMIN D, 25 HYDROXY   Result Value Ref Range    Vitamin D 25-Hydroxy 23.9 (L) 30 - 100 ng/mL     We reviewed the patient's labs from the last several visits to point out trends in the numbers          Patient Active Problem List   Diagnosis Code    Dyslipidemia E78.5    Hypovitaminosis D E55.9    Obesity (BMI 30.0-34. 9) E66.9    Primary hypertension I10    Nephrolithiasis Dr Amari Young N20.0    Colon adenoma D12.6    Controlled type 2 diabetes mellitus without complication, without long-term current use of insulin (HCC) E11.9     Assessment and plan:  1. Hypovit D. Restart 5k/d otc  2. Nephrolithiasis. Hydration and f/u urology  3. HTN. Continue amlo and inc huber to 40. Call w update in 2-3 weeks and adjust further as needed  4. Obesity. Lifestyle and dietary measures, portion control; glp as below  5. Colon adenoma. Fiber, colo 2026  6. DM. Continue metformin. Discussed glp vs sglt2 including pros and cons, cost, etc.  He is willing to try ozempic, will start at 0.25 and titrate up. Sfx reviewed  7. Dyslipidemia. Continue lipitor. RTC 3/23    Above conditions discussed at length and patient vocalized understanding. All questions answered to patient satisfaction          ICD-10-CM ICD-9-CM    1. Controlled type 2 diabetes mellitus without complication, without long-term current use of insulin (HCC)  E11.9 250.00 semaglutide (OZEMPIC) 0.25 mg or 0.5 mg/dose (2 mg/1.5 ml) subq pen      MICROALBUMIN, UR, RAND W/ MICROALB/CREAT RATIO      METABOLIC PANEL, BASIC      HEMOGLOBIN A1C WITH EAG      2. Dyslipidemia  E78.5 272.4 LIPID PANEL      3.  Hypovitaminosis D  E55.9 268.9 VITAMIN D, 25 HYDROXY      4. Obesity (BMI 30.0-34. 9)  E66.9 278.00       5.  Primary hypertension  I10 401.9 lisinopriL (PRINIVIL, ZESTRIL) 40 mg tablet

## 2022-11-29 ENCOUNTER — HOSPITAL ENCOUNTER (OUTPATIENT)
Dept: LAB | Age: 52
Discharge: HOME | End: 2022-11-29
Payer: COMMERCIAL

## 2022-11-29 DIAGNOSIS — E11.9 CONTROLLED TYPE 2 DIABETES MELLITUS WITHOUT COMPLICATION, WITHOUT LONG-TERM CURRENT USE OF INSULIN (HCC): ICD-10-CM

## 2022-11-29 LAB
25(OH)D3 SERPL-MCNC: 23.9 NG/ML (ref 30–100)
ALBUMIN SERPL-MCNC: 4.3 G/DL (ref 3.4–5)
ALBUMIN/GLOB SERPL: 1.3 {RATIO} (ref 0.8–1.7)
ALP SERPL-CCNC: 72 U/L (ref 45–117)
ALT SERPL-CCNC: 51 U/L (ref 16–61)
ANION GAP SERPL CALC-SCNC: 5 MMOL/L (ref 3–18)
AST SERPL-CCNC: 30 U/L (ref 10–38)
BILIRUB SERPL-MCNC: 0.5 MG/DL (ref 0.2–1)
BUN SERPL-MCNC: 12 MG/DL (ref 7–18)
BUN/CREAT SERPL: 13 (ref 12–20)
CALCIUM SERPL-MCNC: 9.4 MG/DL (ref 8.5–10.1)
CHLORIDE SERPL-SCNC: 105 MMOL/L (ref 100–111)
CO2 SERPL-SCNC: 28 MMOL/L (ref 21–32)
CREAT SERPL-MCNC: 0.94 MG/DL (ref 0.6–1.3)
ERYTHROCYTE [DISTWIDTH] IN BLOOD BY AUTOMATED COUNT: 12.2 % (ref 11.6–14.5)
EST. AVERAGE GLUCOSE BLD GHB EST-MCNC: 123 MG/DL
GLOBULIN SER CALC-MCNC: 3.2 G/DL (ref 2–4)
GLUCOSE SERPL-MCNC: 124 MG/DL (ref 74–99)
HBA1C MFR BLD: 5.9 % (ref 4.2–5.6)
HCT VFR BLD AUTO: 46.3 % (ref 36–48)
HGB BLD-MCNC: 15.8 G/DL (ref 13–16)
MCH RBC QN AUTO: 29.9 PG (ref 24–34)
MCHC RBC AUTO-ENTMCNC: 34.1 G/DL (ref 31–37)
MCV RBC AUTO: 87.5 FL (ref 78–100)
NRBC # BLD: 0 K/UL (ref 0–0.01)
NRBC BLD-RTO: 0 PER 100 WBC
PLATELET # BLD AUTO: 272 K/UL (ref 135–420)
PMV BLD AUTO: 11.5 FL (ref 9.2–11.8)
POTASSIUM SERPL-SCNC: 4.4 MMOL/L (ref 3.5–5.5)
PROT SERPL-MCNC: 7.5 G/DL (ref 6.4–8.2)
RBC # BLD AUTO: 5.29 M/UL (ref 4.35–5.65)
SODIUM SERPL-SCNC: 138 MMOL/L (ref 136–145)
WBC # BLD AUTO: 7.3 K/UL (ref 4.6–13.2)

## 2022-11-29 PROCEDURE — 80053 COMPREHEN METABOLIC PANEL: CPT

## 2022-11-29 PROCEDURE — 82306 VITAMIN D 25 HYDROXY: CPT

## 2022-11-29 PROCEDURE — 83036 HEMOGLOBIN GLYCOSYLATED A1C: CPT

## 2022-11-29 PROCEDURE — 36415 COLL VENOUS BLD VENIPUNCTURE: CPT

## 2022-11-29 PROCEDURE — 85027 COMPLETE CBC AUTOMATED: CPT

## 2022-11-29 NOTE — PROGRESS NOTES
Arslan Santos presents today for   Chief Complaint   Patient presents with    Follow-up     Controlled type 2 diabetes w out long term insulin use   Primary HTN   Dyslipidemia     Labs     11-29-22     1. \"Have you been to the ER, urgent care clinic since your last visit? Hospitalized since your last visit? \" no    2. \"Have you seen or consulted any other health care providers outside of the [address] [place] [address] since your last visit? \" yes     3. For patients aged 39-70: Has the patient had a colonoscopy / FIT/ Cologuard? Yes - no Care Gap present      If the patient is female:    4. For patients aged 41-77: Has the patient had a mammogram within the past 2 years? NA - based on age or sex  See top three    5. For patients aged 21-65: Has the patient had a pap smear?  NA - based on age or sex

## 2022-11-30 ENCOUNTER — OFFICE VISIT (OUTPATIENT)
Dept: INTERNAL MEDICINE CLINIC | Age: 52
End: 2022-11-30
Payer: COMMERCIAL

## 2022-11-30 ENCOUNTER — TELEPHONE (OUTPATIENT)
Dept: INTERNAL MEDICINE CLINIC | Age: 52
End: 2022-11-30

## 2022-11-30 VITALS
BODY MASS INDEX: 32.9 KG/M2 | SYSTOLIC BLOOD PRESSURE: 151 MMHG | TEMPERATURE: 97.1 F | HEART RATE: 76 BPM | OXYGEN SATURATION: 97 % | DIASTOLIC BLOOD PRESSURE: 88 MMHG | WEIGHT: 235 LBS | RESPIRATION RATE: 18 BRPM | HEIGHT: 71 IN

## 2022-11-30 DIAGNOSIS — I10 PRIMARY HYPERTENSION: ICD-10-CM

## 2022-11-30 DIAGNOSIS — E66.9 OBESITY (BMI 30.0-34.9): ICD-10-CM

## 2022-11-30 DIAGNOSIS — E55.9 HYPOVITAMINOSIS D: ICD-10-CM

## 2022-11-30 DIAGNOSIS — E78.5 DYSLIPIDEMIA: ICD-10-CM

## 2022-11-30 DIAGNOSIS — E11.9 CONTROLLED TYPE 2 DIABETES MELLITUS WITHOUT COMPLICATION, WITHOUT LONG-TERM CURRENT USE OF INSULIN (HCC): Primary | ICD-10-CM

## 2022-11-30 PROCEDURE — 3044F HG A1C LEVEL LT 7.0%: CPT | Performed by: INTERNAL MEDICINE

## 2022-11-30 PROCEDURE — 3074F SYST BP LT 130 MM HG: CPT | Performed by: INTERNAL MEDICINE

## 2022-11-30 PROCEDURE — 3078F DIAST BP <80 MM HG: CPT | Performed by: INTERNAL MEDICINE

## 2022-11-30 PROCEDURE — 99214 OFFICE O/P EST MOD 30 MIN: CPT | Performed by: INTERNAL MEDICINE

## 2022-11-30 RX ORDER — LISINOPRIL 40 MG/1
40 TABLET ORAL DAILY
Qty: 90 TABLET | Refills: 3 | Status: SHIPPED | OUTPATIENT
Start: 2022-11-30

## 2022-12-03 DIAGNOSIS — E78.5 DYSLIPIDEMIA: ICD-10-CM

## 2022-12-06 RX ORDER — ATORVASTATIN CALCIUM 10 MG/1
TABLET, FILM COATED ORAL
Qty: 90 TABLET | Refills: 3 | Status: SHIPPED | OUTPATIENT
Start: 2022-12-06

## 2023-01-21 DIAGNOSIS — E11.9 CONTROLLED TYPE 2 DIABETES MELLITUS WITHOUT COMPLICATION, WITHOUT LONG-TERM CURRENT USE OF INSULIN (HCC): ICD-10-CM

## 2023-01-23 NOTE — TELEPHONE ENCOUNTER
PCP: Steph Echeverria MD    Last appt: 2022  Future Appointments   Date Time Provider David Lee   2023  3:00 PM Steph Echeverria MD [facility] BS AMB       Requested Prescriptions     Pending Prescriptions Disp Refills    semaglutide (OZEMPIC) 0.25 mg or 0.5 mg/dose (2 mg/1.5 ml) subq pen 1 Box 0     Si.25 mg by SubCUTAneous route every seven (7) days.

## 2023-02-04 DIAGNOSIS — E11.9 CONTROLLED TYPE 2 DIABETES MELLITUS WITHOUT COMPLICATION, WITHOUT LONG-TERM CURRENT USE OF INSULIN (HCC): Primary | ICD-10-CM

## 2023-02-05 DIAGNOSIS — E11.9 CONTROLLED TYPE 2 DIABETES MELLITUS WITHOUT COMPLICATION, WITHOUT LONG-TERM CURRENT USE OF INSULIN (HCC): Primary | ICD-10-CM

## 2023-02-06 DIAGNOSIS — E11.9 CONTROLLED TYPE 2 DIABETES MELLITUS WITHOUT COMPLICATION, WITHOUT LONG-TERM CURRENT USE OF INSULIN (HCC): Primary | ICD-10-CM

## 2023-02-07 DIAGNOSIS — E55.9 HYPOVITAMINOSIS D: Primary | ICD-10-CM

## 2023-02-07 DIAGNOSIS — E78.5 DYSLIPIDEMIA: Primary | ICD-10-CM

## 2023-03-05 DIAGNOSIS — I10 ESSENTIAL (PRIMARY) HYPERTENSION: ICD-10-CM

## 2023-03-06 RX ORDER — AMLODIPINE BESYLATE 5 MG/1
TABLET ORAL
Qty: 90 TABLET | Refills: 0 | Status: SHIPPED | OUTPATIENT
Start: 2023-03-06

## 2023-03-22 NOTE — TELEPHONE ENCOUNTER
Received a requesting a new script for 3ml dosing to fill for patient.      PCP: Brigid Harden MD    Last appt: [unfilled]  Future Appointments   Date Time Provider Jorge Colin   4/4/2023  3:00 PM Jeramy Krause MD [facility] BS AMB       Requested Prescriptions     Pending Prescriptions Disp Refills    Semaglutide, 2 MG/DOSE, 8 MG/3ML SOPN       Sig: Inject into the skin

## 2023-04-04 DIAGNOSIS — E11.9 TYPE 2 DIABETES MELLITUS WITHOUT COMPLICATIONS (HCC): ICD-10-CM

## 2023-04-06 RX ORDER — METFORMIN HYDROCHLORIDE 500 MG/1
TABLET, EXTENDED RELEASE ORAL
Qty: 180 TABLET | Refills: 3 | Status: SHIPPED | OUTPATIENT
Start: 2023-04-06

## 2023-04-29 ENCOUNTER — HOSPITAL ENCOUNTER (OUTPATIENT)
Facility: HOSPITAL | Age: 53
Discharge: HOME | End: 2023-05-02
Payer: COMMERCIAL

## 2023-04-29 LAB
25(OH)D3 SERPL-MCNC: 42.6 NG/ML (ref 30–100)
ANION GAP SERPL CALC-SCNC: 2 MMOL/L (ref 3–18)
BUN SERPL-MCNC: 14 MG/DL (ref 7–18)
BUN/CREAT SERPL: 15 (ref 12–20)
CALCIUM SERPL-MCNC: 9.4 MG/DL (ref 8.5–10.1)
CHLORIDE SERPL-SCNC: 109 MMOL/L (ref 100–111)
CHOLEST SERPL-MCNC: 93 MG/DL
CO2 SERPL-SCNC: 30 MMOL/L (ref 21–32)
CREAT SERPL-MCNC: 0.92 MG/DL (ref 0.6–1.3)
CREAT UR-MCNC: 204 MG/DL (ref 30–125)
EST. AVERAGE GLUCOSE BLD GHB EST-MCNC: 111 MG/DL
GLUCOSE SERPL-MCNC: 114 MG/DL (ref 74–99)
HBA1C MFR BLD: 5.5 % (ref 4.2–5.6)
HDLC SERPL-MCNC: 32 MG/DL (ref 40–60)
HDLC SERPL: 2.9 (ref 0–5)
LDLC SERPL CALC-MCNC: 20.6 MG/DL (ref 0–100)
LIPID PANEL: ABNORMAL
MICROALBUMIN UR-MCNC: 1.2 MG/DL (ref 0–3)
MICROALBUMIN/CREAT UR-RTO: 6 MG/G (ref 0–30)
POTASSIUM SERPL-SCNC: 4.1 MMOL/L (ref 3.5–5.5)
SODIUM SERPL-SCNC: 141 MMOL/L (ref 136–145)
TRIGL SERPL-MCNC: 202 MG/DL
VLDLC SERPL CALC-MCNC: 40.4 MG/DL

## 2023-04-29 PROCEDURE — 83036 HEMOGLOBIN GLYCOSYLATED A1C: CPT

## 2023-04-29 PROCEDURE — 80048 BASIC METABOLIC PNL TOTAL CA: CPT

## 2023-04-29 PROCEDURE — 36415 COLL VENOUS BLD VENIPUNCTURE: CPT

## 2023-04-29 PROCEDURE — 82043 UR ALBUMIN QUANTITATIVE: CPT

## 2023-04-29 PROCEDURE — 82306 VITAMIN D 25 HYDROXY: CPT

## 2023-04-29 PROCEDURE — 82570 ASSAY OF URINE CREATININE: CPT

## 2023-04-29 PROCEDURE — 80061 LIPID PANEL: CPT

## 2023-05-01 ENCOUNTER — OFFICE VISIT (OUTPATIENT)
Age: 53
End: 2023-05-01
Payer: COMMERCIAL

## 2023-05-01 VITALS
HEART RATE: 83 BPM | RESPIRATION RATE: 20 BRPM | OXYGEN SATURATION: 99 % | WEIGHT: 230 LBS | DIASTOLIC BLOOD PRESSURE: 85 MMHG | SYSTOLIC BLOOD PRESSURE: 141 MMHG | TEMPERATURE: 98.3 F | BODY MASS INDEX: 32.2 KG/M2 | HEIGHT: 71 IN

## 2023-05-01 DIAGNOSIS — E78.5 DYSLIPIDEMIA: ICD-10-CM

## 2023-05-01 DIAGNOSIS — E11.9 CONTROLLED TYPE 2 DIABETES MELLITUS WITHOUT COMPLICATION, WITHOUT LONG-TERM CURRENT USE OF INSULIN (HCC): Primary | ICD-10-CM

## 2023-05-01 DIAGNOSIS — I10 PRIMARY HYPERTENSION: ICD-10-CM

## 2023-05-01 DIAGNOSIS — E66.9 OBESITY (BMI 30.0-34.9): ICD-10-CM

## 2023-05-01 PROCEDURE — 99214 OFFICE O/P EST MOD 30 MIN: CPT | Performed by: INTERNAL MEDICINE

## 2023-05-01 PROCEDURE — 3079F DIAST BP 80-89 MM HG: CPT | Performed by: INTERNAL MEDICINE

## 2023-05-01 PROCEDURE — 3077F SYST BP >= 140 MM HG: CPT | Performed by: INTERNAL MEDICINE

## 2023-05-01 PROCEDURE — 3044F HG A1C LEVEL LT 7.0%: CPT | Performed by: INTERNAL MEDICINE

## 2023-05-01 SDOH — ECONOMIC STABILITY: HOUSING INSECURITY
IN THE LAST 12 MONTHS, WAS THERE A TIME WHEN YOU DID NOT HAVE A STEADY PLACE TO SLEEP OR SLEPT IN A SHELTER (INCLUDING NOW)?: NO

## 2023-05-01 SDOH — ECONOMIC STABILITY: FOOD INSECURITY: WITHIN THE PAST 12 MONTHS, THE FOOD YOU BOUGHT JUST DIDN'T LAST AND YOU DIDN'T HAVE MONEY TO GET MORE.: NEVER TRUE

## 2023-05-01 SDOH — ECONOMIC STABILITY: FOOD INSECURITY: WITHIN THE PAST 12 MONTHS, YOU WORRIED THAT YOUR FOOD WOULD RUN OUT BEFORE YOU GOT MONEY TO BUY MORE.: NEVER TRUE

## 2023-05-01 SDOH — ECONOMIC STABILITY: INCOME INSECURITY: HOW HARD IS IT FOR YOU TO PAY FOR THE VERY BASICS LIKE FOOD, HOUSING, MEDICAL CARE, AND HEATING?: NOT HARD AT ALL

## 2023-05-01 ASSESSMENT — PATIENT HEALTH QUESTIONNAIRE - PHQ9
SUM OF ALL RESPONSES TO PHQ QUESTIONS 1-9: 0
SUM OF ALL RESPONSES TO PHQ9 QUESTIONS 1 & 2: 0
SUM OF ALL RESPONSES TO PHQ QUESTIONS 1-9: 0
SUM OF ALL RESPONSES TO PHQ QUESTIONS 1-9: 0
1. LITTLE INTEREST OR PLEASURE IN DOING THINGS: 0
2. FEELING DOWN, DEPRESSED OR HOPELESS: 0
SUM OF ALL RESPONSES TO PHQ QUESTIONS 1-9: 0

## 2023-06-01 DIAGNOSIS — I10 ESSENTIAL (PRIMARY) HYPERTENSION: ICD-10-CM

## 2023-06-01 NOTE — TELEPHONE ENCOUNTER
PCP:  Willy Fairchild MD    Last appt: [unfilled]  Future Appointments   Date Time Provider Jorge Colin   10/2/2023  1:20 PM Lady Shen MD [facility] AMB       Requested Prescriptions     Pending Prescriptions Disp Refills    amLODIPine (NORVASC) 5 MG tablet [Pharmacy Med Name: AMLODIPINE BESYLATE 5 MG TAB] 90 tablet 3     Sig: TAKE 1 TABLET BY MOUTH EVERY DAY

## 2023-06-04 RX ORDER — AMLODIPINE BESYLATE 5 MG/1
TABLET ORAL
Qty: 90 TABLET | Refills: 3 | Status: SHIPPED | OUTPATIENT
Start: 2023-06-04

## 2023-10-06 RX ORDER — SEMAGLUTIDE 2.68 MG/ML
INJECTION, SOLUTION SUBCUTANEOUS
Qty: 3 ML | Refills: 5 | Status: SHIPPED | OUTPATIENT
Start: 2023-10-06

## 2023-10-13 ENCOUNTER — TELEPHONE (OUTPATIENT)
Age: 53
End: 2023-10-13

## 2023-10-13 NOTE — TELEPHONE ENCOUNTER
----- Message from Lalitha Ambrose sent at 10/12/2023  3:41 PM EDT -----  Subject: Refill Request    QUESTIONS  Name of Medication? OZEMPIC, 2 MG/DOSE, 8 MG/3ML SOPN  Patient-reported dosage and instructions? once a week  How many days do you have left? 0  Preferred Pharm[facility]? [facility] [address] phone number (if available)? [phone]  ---------------------------------------------------------------------------  --------------  CALL BACK INFO  What is the best way for the office to contact you? OK to leave message on   voicemail  Preferred Call Back Phone Number? [phone]  ---------------------------------------------------------------------------  --------------  SCRIPT ANSWERS  Relationship to Patient?  Self

## 2023-10-16 ENCOUNTER — PATIENT MESSAGE (OUTPATIENT)
Age: 53
End: 2023-10-16

## 2023-10-17 ENCOUNTER — TELEPHONE (OUTPATIENT)
Age: 53
End: 2023-10-17

## 2023-10-17 NOTE — TELEPHONE ENCOUNTER
----- Message from [employer] sent at 10/16/2023  3:49 PM EDT -----  Regarding: Ozempic Rx  Contact: [phone]  Hi Dr. Pablo Dodd. Unfortunately, I am not having much luck with getting my Ozempic Rx filled. [facility] claims it's a [facility]. Best case scenario sounds like 10/27 for a refill. When I finally do get my RX filled should I resume my normal dose considering the length of time in between doses? Thank you.     Michelle Vazquez

## 2023-10-18 NOTE — TELEPHONE ENCOUNTER
Mr. Kaur Garcia,    Per Dr. Divina Corbett, it is ok to wait and restart when available. Another option is to change to trulicity (although there have been shortages with that also)  There is a very high demand for both.     Ela Mendoza

## 2023-10-18 NOTE — TELEPHONE ENCOUNTER
Ok to wait and restart when available  Other option is to change to trulicity (although there have been shortages with that also)  Very high demand for both

## 2023-11-28 DIAGNOSIS — E78.5 HYPERLIPIDEMIA, UNSPECIFIED: ICD-10-CM

## 2023-11-28 DIAGNOSIS — I10 ESSENTIAL (PRIMARY) HYPERTENSION: ICD-10-CM

## 2023-11-28 NOTE — TELEPHONE ENCOUNTER
Last OV: 5/1/2023  Next OV: 12/4/2023  Last refill:    11/30/2022 -- Lisinopril  12/6/2022 -- atorvastatin

## 2023-11-29 RX ORDER — SEMAGLUTIDE 1.34 MG/ML
INJECTION, SOLUTION SUBCUTANEOUS
OUTPATIENT
Start: 2023-11-29

## 2023-11-29 RX ORDER — LISINOPRIL 40 MG/1
40 TABLET ORAL DAILY
Qty: 90 TABLET | Refills: 1 | Status: SHIPPED | OUTPATIENT
Start: 2023-11-29

## 2023-11-29 RX ORDER — ATORVASTATIN CALCIUM 10 MG/1
TABLET, FILM COATED ORAL
Qty: 90 TABLET | Refills: 1 | Status: SHIPPED | OUTPATIENT
Start: 2023-11-29

## 2023-12-05 RX ORDER — SEMAGLUTIDE 1.34 MG/ML
INJECTION, SOLUTION SUBCUTANEOUS
OUTPATIENT
Start: 2023-12-05

## 2024-06-01 DIAGNOSIS — E78.5 HYPERLIPIDEMIA, UNSPECIFIED: ICD-10-CM

## 2024-06-01 DIAGNOSIS — I10 ESSENTIAL (PRIMARY) HYPERTENSION: ICD-10-CM

## 2024-06-03 RX ORDER — AMLODIPINE BESYLATE 5 MG/1
TABLET ORAL
Qty: 30 TABLET | Refills: 0 | Status: SHIPPED | OUTPATIENT
Start: 2024-06-03

## 2024-06-03 RX ORDER — ATORVASTATIN CALCIUM 10 MG/1
TABLET, FILM COATED ORAL
Qty: 30 TABLET | Refills: 0 | Status: SHIPPED | OUTPATIENT
Start: 2024-06-03

## 2024-06-03 RX ORDER — LISINOPRIL 40 MG/1
40 TABLET ORAL DAILY
Qty: 30 TABLET | Refills: 0 | Status: SHIPPED | OUTPATIENT
Start: 2024-06-03

## 2024-07-03 DIAGNOSIS — E78.5 HYPERLIPIDEMIA, UNSPECIFIED: ICD-10-CM

## 2024-07-03 DIAGNOSIS — I10 ESSENTIAL (PRIMARY) HYPERTENSION: ICD-10-CM

## 2024-07-03 RX ORDER — AMLODIPINE BESYLATE 5 MG/1
TABLET ORAL
Qty: 30 TABLET | Refills: 0 | Status: SHIPPED | OUTPATIENT
Start: 2024-07-03

## 2024-07-03 RX ORDER — LISINOPRIL 40 MG/1
40 TABLET ORAL DAILY
Qty: 30 TABLET | Refills: 0 | Status: SHIPPED | OUTPATIENT
Start: 2024-07-03

## 2024-07-03 RX ORDER — ATORVASTATIN CALCIUM 10 MG/1
TABLET, FILM COATED ORAL
Qty: 30 TABLET | Refills: 0 | Status: SHIPPED | OUTPATIENT
Start: 2024-07-03

## 2024-07-31 DIAGNOSIS — I10 ESSENTIAL (PRIMARY) HYPERTENSION: ICD-10-CM

## 2024-07-31 DIAGNOSIS — E78.5 HYPERLIPIDEMIA, UNSPECIFIED: ICD-10-CM

## 2024-08-04 RX ORDER — AMLODIPINE BESYLATE 5 MG/1
TABLET ORAL
Qty: 90 TABLET | Refills: 1 | OUTPATIENT
Start: 2024-08-04

## 2024-08-04 RX ORDER — ATORVASTATIN CALCIUM 10 MG/1
TABLET, FILM COATED ORAL
Qty: 90 TABLET | Refills: 1 | OUTPATIENT
Start: 2024-08-04

## 2024-08-04 RX ORDER — LISINOPRIL 40 MG/1
40 TABLET ORAL DAILY
Qty: 90 TABLET | Refills: 1 | OUTPATIENT
Start: 2024-08-04

## 2024-09-25 ENCOUNTER — PATIENT MESSAGE (OUTPATIENT)
Facility: CLINIC | Age: 54
End: 2024-09-25

## 2024-09-25 DIAGNOSIS — I10 ESSENTIAL (PRIMARY) HYPERTENSION: ICD-10-CM

## 2024-09-25 RX ORDER — LISINOPRIL 40 MG/1
40 TABLET ORAL DAILY
Qty: 30 TABLET | Refills: 0 | Status: SHIPPED | OUTPATIENT
Start: 2024-09-25

## 2024-09-25 RX ORDER — LISINOPRIL 40 MG/1
40 TABLET ORAL DAILY
Qty: 30 TABLET | Refills: 0 | Status: CANCELLED | OUTPATIENT
Start: 2024-09-25

## 2024-09-25 RX ORDER — AMLODIPINE BESYLATE 5 MG/1
5 TABLET ORAL DAILY
Qty: 30 TABLET | Refills: 0 | Status: SHIPPED | OUTPATIENT
Start: 2024-09-25

## 2024-10-16 NOTE — PROGRESS NOTES
53 y.o. male who presents for evaluation.    He stopped taking the ozempic last year a she kep having trouble getting the [facility].  Denies polyuria, polydipsia, nocturia, vision change.  Getting <120 when he checks about 3x/week.  Weight stable although he did get about 17 lbs off just before he stopped taking    Bp controlled. No cardiovascular complaints.  No set exercise program, just too busy with work schedule. His company was bought by [employer] so changed employers    No gi or  complaints    Requesting referral to derm for a rapidly growing skin lesion in the right lat calf and also for the psoriasis    Past Medical History:   Diagnosis Date    Cellulitis     Dr Rock left leg    Colon adenoma 2021    Dr Lay and hyperplastic    DM (diabetes mellitus) (HCC)     [facility] 10/22;    Dyslipidemia     Hypertension     Nephrolithiasis     Dr Combs ; ; ; ca oxalate in past;  w RIGHT hydronephrosis s/p laser litho and URS Dr King    Obesity     IF ; ozempic  start wt 235 lbs    Proptosis     RIGHT side, negative thyroid eval    Psoriasis     Umbilical hernia 2020     Past Surgical History:   Procedure Laterality Date    APPENDECTOMY      COLONOSCOPY      Dr Lay 3/1/21 hyperplastic and adenoma    KNEE ARTHROSCOPY Left 2013    Dr Oneil    TYMPANOSTOMY TUBE PLACEMENT       Social History     Socioeconomic History    Marital status:      Spouse name: Not on file    Number of children: Not on file    Years of education: Not on file    Highest education level: Not on file   Occupational History    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

## 2024-10-17 ENCOUNTER — HOSPITAL ENCOUNTER (OUTPATIENT)
Facility: HOSPITAL | Age: 54
Discharge: HOME | End: 2024-10-20
Payer: COMMERCIAL

## 2024-10-17 DIAGNOSIS — E11.9 CONTROLLED TYPE 2 DIABETES MELLITUS WITHOUT COMPLICATION, WITHOUT LONG-TERM CURRENT USE OF INSULIN (HCC): ICD-10-CM

## 2024-10-17 LAB
ALBUMIN SERPL-MCNC: 4.4 G/DL (ref 3.4–5)
ALBUMIN/GLOB SERPL: 1.3 (ref 0.8–1.7)
ALP SERPL-CCNC: 63 U/L (ref 45–117)
ALT SERPL-CCNC: 42 U/L (ref 16–61)
ANION GAP SERPL CALC-SCNC: 4 MMOL/L (ref 3–18)
AST SERPL-CCNC: 23 U/L (ref 10–38)
BILIRUB SERPL-MCNC: 0.6 MG/DL (ref 0.2–1)
BUN SERPL-MCNC: 10 MG/DL (ref 7–18)
BUN/CREAT SERPL: 12 (ref 12–20)
CALCIUM SERPL-MCNC: 9.4 MG/DL (ref 8.5–10.1)
CHLORIDE SERPL-SCNC: 105 MMOL/L (ref 100–111)
CO2 SERPL-SCNC: 28 MMOL/L (ref 21–32)
CREAT SERPL-MCNC: 0.82 MG/DL (ref 0.6–1.3)
CREAT UR-MCNC: 171 MG/DL (ref 30–125)
GLOBULIN SER CALC-MCNC: 3.3 G/DL (ref 2–4)
GLUCOSE SERPL-MCNC: 121 MG/DL (ref 74–99)
HBA1C MFR BLD: 5.8 % (ref 4.2–5.6)
MICROALBUMIN UR-MCNC: 0.82 MG/DL (ref 0–3)
MICROALBUMIN/CREAT UR-RTO: 5 MG/G (ref 0–30)
POTASSIUM SERPL-SCNC: 4.6 MMOL/L (ref 3.5–5.5)
PROT SERPL-MCNC: 7.7 G/DL (ref 6.4–8.2)
SODIUM SERPL-SCNC: 137 MMOL/L (ref 136–145)

## 2024-10-17 PROCEDURE — 80053 COMPREHEN METABOLIC PANEL: CPT

## 2024-10-17 PROCEDURE — 83036 HEMOGLOBIN GLYCOSYLATED A1C: CPT

## 2024-10-17 PROCEDURE — 82570 ASSAY OF URINE CREATININE: CPT

## 2024-10-17 PROCEDURE — 82043 UR ALBUMIN QUANTITATIVE: CPT

## 2024-10-17 PROCEDURE — 36415 COLL VENOUS BLD VENIPUNCTURE: CPT

## 2024-10-21 ENCOUNTER — OFFICE VISIT (OUTPATIENT)
Facility: CLINIC | Age: 54
End: 2024-10-21
Payer: COMMERCIAL

## 2024-10-21 VITALS
RESPIRATION RATE: 16 BRPM | DIASTOLIC BLOOD PRESSURE: 88 MMHG | HEIGHT: 71 IN | TEMPERATURE: 98.6 F | OXYGEN SATURATION: 96 % | HEART RATE: 82 BPM | WEIGHT: 236 LBS | SYSTOLIC BLOOD PRESSURE: 135 MMHG | BODY MASS INDEX: 33.04 KG/M2

## 2024-10-21 DIAGNOSIS — Z00.00 PHYSICAL EXAM: ICD-10-CM

## 2024-10-21 DIAGNOSIS — E11.9 CONTROLLED TYPE 2 DIABETES MELLITUS WITHOUT COMPLICATION, WITHOUT LONG-TERM CURRENT USE OF INSULIN (HCC): ICD-10-CM

## 2024-10-21 DIAGNOSIS — I10 PRIMARY HYPERTENSION: ICD-10-CM

## 2024-10-21 DIAGNOSIS — Z11.59 NEED FOR HEPATITIS C SCREENING TEST: ICD-10-CM

## 2024-10-21 DIAGNOSIS — E78.5 DYSLIPIDEMIA: ICD-10-CM

## 2024-10-21 DIAGNOSIS — D12.6 COLON ADENOMA: ICD-10-CM

## 2024-10-21 DIAGNOSIS — E55.9 HYPOVITAMINOSIS D: ICD-10-CM

## 2024-10-21 DIAGNOSIS — Z23 NEED FOR PROPHYLACTIC VACCINATION AGAINST STREPTOCOCCUS PNEUMONIAE (PNEUMOCOCCUS): Primary | ICD-10-CM

## 2024-10-21 DIAGNOSIS — L40.9 PSORIASIS: ICD-10-CM

## 2024-10-21 DIAGNOSIS — L98.9 SKIN LESION: ICD-10-CM

## 2024-10-21 DIAGNOSIS — E66.811 OBESITY (BMI 30.0-34.9): ICD-10-CM

## 2024-10-21 PROCEDURE — 99396 PREV VISIT EST AGE 40-64: CPT | Performed by: INTERNAL MEDICINE

## 2024-10-21 PROCEDURE — 3079F DIAST BP 80-89 MM HG: CPT | Performed by: INTERNAL MEDICINE

## 2024-10-21 PROCEDURE — 3075F SYST BP GE 130 - 139MM HG: CPT | Performed by: INTERNAL MEDICINE

## 2024-10-21 PROCEDURE — 90471 IMMUNIZATION ADMIN: CPT | Performed by: INTERNAL MEDICINE

## 2024-10-21 PROCEDURE — 90677 PCV20 VACCINE IM: CPT | Performed by: INTERNAL MEDICINE

## 2024-10-21 RX ORDER — MULTIVITAMIN
1 TABLET ORAL DAILY
COMMUNITY

## 2024-10-21 SDOH — ECONOMIC STABILITY: INCOME INSECURITY: HOW HARD IS IT FOR YOU TO PAY FOR THE VERY BASICS LIKE FOOD, HOUSING, MEDICAL CARE, AND HEATING?: NOT HARD AT ALL

## 2024-10-21 SDOH — ECONOMIC STABILITY: FOOD INSECURITY: WITHIN THE PAST 12 MONTHS, THE FOOD YOU BOUGHT JUST DIDN'T LAST AND YOU DIDN'T HAVE MONEY TO GET MORE.: NEVER TRUE

## 2024-10-21 SDOH — ECONOMIC STABILITY: FOOD INSECURITY: WITHIN THE PAST 12 MONTHS, YOU WORRIED THAT YOUR FOOD WOULD RUN OUT BEFORE YOU GOT MONEY TO BUY MORE.: NEVER TRUE

## 2024-10-21 ASSESSMENT — PATIENT HEALTH QUESTIONNAIRE - PHQ9
2. FEELING DOWN, DEPRESSED OR HOPELESS: NOT AT ALL
SUM OF ALL RESPONSES TO PHQ QUESTIONS 1-9: 0
SUM OF ALL RESPONSES TO PHQ9 QUESTIONS 1 & 2: 0
SUM OF ALL RESPONSES TO PHQ QUESTIONS 1-9: 0
1. LITTLE INTEREST OR PLEASURE IN DOING THINGS: NOT AT ALL

## 2024-10-21 NOTE — PROGRESS NOTES
Brayan Miller presents today for   Chief Complaint   Patient presents with    Annual Exam       \"Have you been to the ER, urgent care clinic since your last visit?  Hospitalized since your last visit?\"    NO    “Have you seen or consulted any other health care providers outside of [facility] since your last visit?”    NO

## 2024-10-21 NOTE — PROGRESS NOTES
Verbal order read back per Dr. Tesfaye.  Patient received Prevnar 20 vaccine in RIGHT deltoid.  Patient tolerated well and left without complaints.  Patient received VIS.

## 2024-10-22 DIAGNOSIS — I10 ESSENTIAL (PRIMARY) HYPERTENSION: ICD-10-CM

## 2024-10-24 RX ORDER — LISINOPRIL 40 MG/1
40 TABLET ORAL DAILY
Qty: 90 TABLET | Refills: 3 | Status: SHIPPED | OUTPATIENT
Start: 2024-10-24

## 2024-10-25 DIAGNOSIS — I10 ESSENTIAL (PRIMARY) HYPERTENSION: ICD-10-CM

## 2024-10-25 NOTE — TELEPHONE ENCOUNTER
PCP: Elder Tesfaye MD    LAST OFFICE VISIT: 10/21/2024    LAST REFILL PER CHART:  Medication:amLODIPine (NORVASC) 5 MG tablet   Ordered On:09/25/2024  Instructions: Take 1 tablet by mouth daily   Dispense:30 tablets  Refills:0      No future appointments.

## 2024-10-28 RX ORDER — AMLODIPINE BESYLATE 5 MG/1
5 TABLET ORAL DAILY
Qty: 90 TABLET | Refills: 3 | Status: SHIPPED | OUTPATIENT
Start: 2024-10-28

## 2024-11-02 ENCOUNTER — HOSPITAL ENCOUNTER (OUTPATIENT)
Facility: HOSPITAL | Age: 54
Discharge: HOME | End: 2024-11-05
Payer: COMMERCIAL

## 2024-11-02 DIAGNOSIS — Z11.59 NEED FOR HEPATITIS C SCREENING TEST: ICD-10-CM

## 2024-11-02 DIAGNOSIS — Z00.00 PHYSICAL EXAM: ICD-10-CM

## 2024-11-02 DIAGNOSIS — E55.9 HYPOVITAMINOSIS D: ICD-10-CM

## 2024-11-02 LAB
25(OH)D3 SERPL-MCNC: 20.8 NG/ML (ref 30–100)
BASOPHILS # BLD: 0 K/UL (ref 0–0.1)
BASOPHILS NFR BLD: 0 % (ref 0–2)
CHOLEST SERPL-MCNC: 156 MG/DL
DIFFERENTIAL METHOD BLD: NORMAL
EOSINOPHIL # BLD: 0.1 K/UL (ref 0–0.4)
EOSINOPHIL NFR BLD: 1 % (ref 0–5)
ERYTHROCYTE [DISTWIDTH] IN BLOOD BY AUTOMATED COUNT: 11.9 % (ref 11.6–14.5)
HCT VFR BLD AUTO: 47.9 % (ref 36–48)
HDLC SERPL-MCNC: 25 MG/DL (ref 40–60)
HDLC SERPL: 6.2 (ref 0–5)
HGB BLD-MCNC: 16 G/DL (ref 13–16)
IMM GRANULOCYTES # BLD AUTO: 0 K/UL (ref 0–0.04)
IMM GRANULOCYTES NFR BLD AUTO: 0 % (ref 0–0.5)
LDLC SERPL CALC-MCNC: ABNORMAL MG/DL (ref 0–100)
LIPID PANEL: ABNORMAL
LYMPHOCYTES # BLD: 2.4 K/UL (ref 0.9–3.6)
LYMPHOCYTES NFR BLD: 32 % (ref 21–52)
MCH RBC QN AUTO: 30.3 PG (ref 24–34)
MCHC RBC AUTO-ENTMCNC: 33.4 G/DL (ref 31–37)
MCV RBC AUTO: 90.7 FL (ref 78–100)
MONOCYTES # BLD: 0.6 K/UL (ref 0.05–1.2)
MONOCYTES NFR BLD: 8 % (ref 3–10)
NEUTS SEG # BLD: 4.2 K/UL (ref 1.8–8)
NEUTS SEG NFR BLD: 58 % (ref 40–73)
NRBC # BLD: 0 K/UL (ref 0–0.01)
NRBC BLD-RTO: 0 PER 100 WBC
PLATELET # BLD AUTO: 263 K/UL (ref 135–420)
PMV BLD AUTO: 11.5 FL (ref 9.2–11.8)
PSA SERPL-MCNC: 0.4 NG/ML (ref 0–4)
RBC # BLD AUTO: 5.28 M/UL (ref 4.35–5.65)
TRIGL SERPL-MCNC: 411 MG/DL
VLDLC SERPL CALC-MCNC: ABNORMAL MG/DL
WBC # BLD AUTO: 7.4 K/UL (ref 4.6–13.2)

## 2024-11-02 PROCEDURE — 85025 COMPLETE CBC W/AUTO DIFF WBC: CPT

## 2024-11-02 PROCEDURE — 86803 HEPATITIS C AB TEST: CPT

## 2024-11-02 PROCEDURE — 82306 VITAMIN D 25 HYDROXY: CPT

## 2024-11-02 PROCEDURE — 36415 COLL VENOUS BLD VENIPUNCTURE: CPT

## 2024-11-02 PROCEDURE — 80061 LIPID PANEL: CPT

## 2024-11-02 PROCEDURE — 84153 ASSAY OF PSA TOTAL: CPT

## 2024-11-04 LAB
HCV AB SER IA-ACNC: 0.08 INDEX
HCV AB SERPL QL IA: NEGATIVE
HEPATITIS C COMMENT: NORMAL

## 2024-11-08 ENCOUNTER — TELEPHONE (OUTPATIENT)
Facility: CLINIC | Age: 54
End: 2024-11-08

## 2024-11-08 NOTE — TELEPHONE ENCOUNTER
Pls call    Results for orders placed or performed during the hospital encounter of 11/02/24 (from the past 2160 hour(s))   PSA, Diagnostic   Result Value Ref Range    PSA 0.4 0.0 - 4.0 ng/mL   Hepatitis C Antibody   Result Value Ref Range    Hepatitis C Ab .08 <0.80 Index    Hep C Ab Interp Negative NEG      Hepatitis C Comment       CBC with Auto Differential   Result Value Ref Range    WBC 7.4 4.6 - 13.2 K/uL    RBC 5.28 4.35 - 5.65 M/uL    Hemoglobin 16.0 13.0 - 16.0 g/dL    Hematocrit 47.9 36.0 - 48.0 %    MCV 90.7 78.0 - 100.0 FL    MCH 30.3 24.0 - 34.0 PG    MCHC 33.4 31.0 - 37.0 g/dL    RDW 11.9 11.6 - 14.5 %    Platelets 263 135 - 420 K/uL    MPV 11.5 9.2 - 11.8 FL    Nucleated RBCs 0.0 0  WBC    nRBC 0.00 0.00 - 0.01 K/uL    Neutrophils % 58 40 - 73 %    Lymphocytes % 32 21 - 52 %    Monocytes % 8 3 - 10 %    Eosinophils % 1 0 - 5 %    Basophils % 0 0 - 2 %    Immature Granulocytes % 0 0.0 - 0.5 %    Neutrophils Absolute 4.2 1.8 - 8.0 K/UL    Lymphocytes Absolute 2.4 0.9 - 3.6 K/UL    Monocytes Absolute 0.6 0.05 - 1.2 K/UL    Eosinophils Absolute 0.1 0.0 - 0.4 K/UL    Basophils Absolute 0.0 0.0 - 0.1 K/UL    Immature Granulocytes Absolute 0.0 0.00 - 0.04 K/UL    Differential Type AUTOMATED     Lipid Panel   Result Value Ref Range    LIPID PANEL        Cholesterol, Total 156 <200 MG/DL    Triglycerides 411 (H) <150 MG/DL    HDL 25 (L) 40 - 60 MG/DL    LDL Cholesterol  0 - 100 MG/DL     LDL AND VLDL CHOLESTEROL NOT CALCULATED WHEN TRIGLYCERIDES >400 MG/DL OR HDL CHOLESTEROL <20 MG/DL    VLDL Cholesterol Calculated  MG/DL     Calculation not valid with this patient's other Lipid values.    Chol/HDL Ratio 6.2 (H) 0 - 5.0     Vitamin D 25 Hydroxy   Result Value Ref Range    Vit D, 25-Hydroxy 20.8 (L) 30 - 100 ng/mL   Results for orders placed or performed during the hospital encounter of 10/17/24 (from the past 2160 hour(s))   Comprehensive Metabolic Panel   Result Value Ref Range    Sodium 137 136 - 145

## 2024-11-21 ENCOUNTER — PATIENT MESSAGE (OUTPATIENT)
Facility: CLINIC | Age: 54
End: 2024-11-21

## 2024-11-21 DIAGNOSIS — E11.9 CONTROLLED TYPE 2 DIABETES MELLITUS WITHOUT COMPLICATION, WITHOUT LONG-TERM CURRENT USE OF INSULIN (HCC): Primary | ICD-10-CM

## 2024-11-21 DIAGNOSIS — I10 PRIMARY HYPERTENSION: ICD-10-CM

## 2024-11-21 DIAGNOSIS — K21.9 GASTROESOPHAGEAL REFLUX DISEASE WITHOUT ESOPHAGITIS: ICD-10-CM

## 2024-11-21 RX ORDER — OMEPRAZOLE 40 MG/1
40 CAPSULE, DELAYED RELEASE ORAL
Qty: 90 CAPSULE | Refills: 1 | Status: SHIPPED | OUTPATIENT
Start: 2024-11-21

## 2024-11-21 RX ORDER — AMLODIPINE BESYLATE 10 MG/1
10 TABLET ORAL DAILY
Qty: 90 TABLET | Refills: 3 | Status: SHIPPED | OUTPATIENT
Start: 2024-11-21

## 2024-11-21 NOTE — TELEPHONE ENCOUNTER
Will inc amlodipine to 10  Will inc ozempic to 2mg next refill  Called in omeprazole 40 mg #90 - call if heartburn does not resolve

## 2025-05-15 DIAGNOSIS — K21.9 GASTROESOPHAGEAL REFLUX DISEASE WITHOUT ESOPHAGITIS: ICD-10-CM

## 2025-05-16 RX ORDER — OMEPRAZOLE 40 MG/1
40 CAPSULE, DELAYED RELEASE ORAL
Qty: 90 CAPSULE | Refills: 1 | Status: SHIPPED | OUTPATIENT
Start: 2025-05-16

## 2025-06-14 DIAGNOSIS — E11.9 CONTROLLED TYPE 2 DIABETES MELLITUS WITHOUT COMPLICATION, WITHOUT LONG-TERM CURRENT USE OF INSULIN (HCC): Primary | ICD-10-CM

## 2025-06-14 DIAGNOSIS — E78.5 HYPERLIPIDEMIA, UNSPECIFIED: ICD-10-CM

## 2025-06-16 RX ORDER — ATORVASTATIN CALCIUM 10 MG/1
10 TABLET, FILM COATED ORAL DAILY
Qty: 30 TABLET | Refills: 0 | Status: SHIPPED | OUTPATIENT
Start: 2025-06-16 | End: 2025-07-14

## 2025-07-12 DIAGNOSIS — E78.5 HYPERLIPIDEMIA, UNSPECIFIED: ICD-10-CM

## 2025-07-14 RX ORDER — ATORVASTATIN CALCIUM 10 MG/1
10 TABLET, FILM COATED ORAL DAILY
Qty: 90 TABLET | Refills: 1 | Status: SHIPPED | OUTPATIENT
Start: 2025-07-14

## 2025-07-27 DIAGNOSIS — E11.9 CONTROLLED TYPE 2 DIABETES MELLITUS WITHOUT COMPLICATION, WITHOUT LONG-TERM CURRENT USE OF INSULIN (HCC): ICD-10-CM

## 2025-07-28 RX ORDER — SEMAGLUTIDE 2.68 MG/ML
INJECTION, SOLUTION SUBCUTANEOUS
Qty: 3 ML | Refills: 3 | Status: SHIPPED | OUTPATIENT
Start: 2025-07-28

## 2025-07-28 NOTE — TELEPHONE ENCOUNTER
PCP: Elder Tesfaye MD    LAST OFFICE VISIT: 10/21/2024    LAST REFILL PER CHART:  Medication:semaglutide, 2 MG/DOSE, (OZEMPIC) 8 MG/3ML SOPN sc injection   Ordered On:11/21/2024  Instructions: Inject 2 mg into the skin every 7 days   Dispense:6mL  Refills:3    Future Appointments   Date Time Provider Department Center   9/8/2025  2:40 PM Elder Tesfaye MD [facility] DEP

## 2025-08-27 ENCOUNTER — TELEPHONE (OUTPATIENT)
Facility: CLINIC | Age: 55
End: 2025-08-27

## 2025-08-27 DIAGNOSIS — Z00.00 PHYSICAL EXAM: Primary | ICD-10-CM

## 2025-09-05 ENCOUNTER — HOSPITAL ENCOUNTER (OUTPATIENT)
Age: 55
Discharge: HOME | End: 2025-09-05
Payer: COMMERCIAL

## 2025-09-05 DIAGNOSIS — Z00.00 PHYSICAL EXAM: ICD-10-CM

## 2025-09-05 LAB
ALBUMIN SERPL-MCNC: 4.1 G/DL (ref 3.4–5)
ALBUMIN/GLOB SERPL: 1.4 (ref 0.8–1.7)
ALP SERPL-CCNC: 77 U/L (ref 45–117)
ALT SERPL-CCNC: 32 U/L (ref 10–50)
ANION GAP SERPL CALC-SCNC: 9 MMOL/L (ref 3–18)
AST SERPL-CCNC: 30 U/L (ref 10–38)
BASOPHILS # BLD: 0.03 K/UL (ref 0–0.1)
BASOPHILS NFR BLD: 0.5 % (ref 0–2)
BILIRUB SERPL-MCNC: 0.5 MG/DL (ref 0.2–1)
BUN SERPL-MCNC: 10 MG/DL (ref 6–23)
BUN/CREAT SERPL: 11 (ref 12–20)
CALCIUM SERPL-MCNC: 10 MG/DL (ref 8.5–10.1)
CHLORIDE SERPL-SCNC: 104 MMOL/L (ref 98–107)
CHOLEST SERPL-MCNC: 78 MG/DL
CO2 SERPL-SCNC: 27 MMOL/L (ref 21–32)
CREAT SERPL-MCNC: 0.92 MG/DL (ref 0.6–1.3)
DIFFERENTIAL METHOD BLD: NORMAL
EOSINOPHIL # BLD: 0.04 K/UL (ref 0–0.4)
EOSINOPHIL NFR BLD: 0.6 % (ref 0–5)
ERYTHROCYTE [DISTWIDTH] IN BLOOD BY AUTOMATED COUNT: 12 % (ref 11.6–14.5)
EST. AVERAGE GLUCOSE BLD GHB EST-MCNC: 108 MG/DL
GLOBULIN SER CALC-MCNC: 2.9 G/DL (ref 2–4)
GLUCOSE SERPL-MCNC: 111 MG/DL (ref 74–108)
HBA1C MFR BLD: 5.4 % (ref 4.2–5.6)
HCT VFR BLD AUTO: 47.2 % (ref 36–48)
HDLC SERPL-MCNC: 34 MG/DL (ref 40–60)
HDLC SERPL: 2.3 (ref 0–5)
HGB BLD-MCNC: 15.6 G/DL (ref 13–16)
IMM GRANULOCYTES # BLD AUTO: 0.01 K/UL (ref 0–0.04)
IMM GRANULOCYTES NFR BLD AUTO: 0.2 % (ref 0–0.5)
LDLC SERPL CALC-MCNC: 30 MG/DL (ref 0–100)
LYMPHOCYTES # BLD: 1.76 K/UL (ref 0.9–3.6)
LYMPHOCYTES NFR BLD: 28.4 % (ref 21–52)
MCH RBC QN AUTO: 29.5 PG (ref 24–34)
MCHC RBC AUTO-ENTMCNC: 33.1 G/DL (ref 31–37)
MCV RBC AUTO: 89.2 FL (ref 78–100)
MONOCYTES # BLD: 0.6 K/UL (ref 0.05–1.2)
MONOCYTES NFR BLD: 9.7 % (ref 3–10)
NEUTS SEG # BLD: 3.76 K/UL (ref 1.8–8)
NEUTS SEG NFR BLD: 60.6 % (ref 40–73)
NRBC # BLD: 0 K/UL (ref 0–0.01)
NRBC BLD-RTO: 0 PER 100 WBC
PLATELET # BLD AUTO: 247 K/UL (ref 135–420)
PMV BLD AUTO: 11.8 FL (ref 9.2–11.8)
POTASSIUM SERPL-SCNC: 4.7 MMOL/L (ref 3.5–5.5)
PROT SERPL-MCNC: 7 G/DL (ref 6.4–8.2)
PSA SERPL-MCNC: 0.4 NG/ML (ref 1.4–4.4)
RBC # BLD AUTO: 5.29 M/UL (ref 4.35–5.65)
SODIUM SERPL-SCNC: 140 MMOL/L (ref 136–145)
TRIGL SERPL-MCNC: 71 MG/DL (ref 0–150)
VLDLC SERPL CALC-MCNC: 14 MG/DL
WBC # BLD AUTO: 6.2 K/UL (ref 4.6–13.2)

## 2025-09-05 PROCEDURE — 36415 COLL VENOUS BLD VENIPUNCTURE: CPT

## 2025-09-05 PROCEDURE — 80061 LIPID PANEL: CPT

## 2025-09-05 PROCEDURE — 80053 COMPREHEN METABOLIC PANEL: CPT

## 2025-09-05 PROCEDURE — 83036 HEMOGLOBIN GLYCOSYLATED A1C: CPT

## 2025-09-05 PROCEDURE — G0103 PSA SCREENING: HCPCS

## 2025-09-05 PROCEDURE — 85025 COMPLETE CBC W/AUTO DIFF WBC: CPT
